# Patient Record
Sex: FEMALE | Race: WHITE | NOT HISPANIC OR LATINO | Employment: UNEMPLOYED | URBAN - NONMETROPOLITAN AREA
[De-identification: names, ages, dates, MRNs, and addresses within clinical notes are randomized per-mention and may not be internally consistent; named-entity substitution may affect disease eponyms.]

---

## 2018-09-28 ENCOUNTER — HOSPITAL ENCOUNTER (EMERGENCY)
Facility: HOSPITAL | Age: 38
Discharge: HOME | End: 2018-09-28
Attending: EMERGENCY MEDICINE | Admitting: EMERGENCY MEDICINE

## 2018-09-28 VITALS
DIASTOLIC BLOOD PRESSURE: 78 MMHG | SYSTOLIC BLOOD PRESSURE: 132 MMHG | RESPIRATION RATE: 16 BRPM | TEMPERATURE: 98.7 F | OXYGEN SATURATION: 99 % | BODY MASS INDEX: 26.68 KG/M2 | HEART RATE: 97 BPM | HEIGHT: 67 IN | WEIGHT: 170 LBS

## 2018-09-28 DIAGNOSIS — A49.02 MRSA INFECTION: ICD-10-CM

## 2018-09-28 DIAGNOSIS — L03.90 CELLULITIS, UNSPECIFIED CELLULITIS SITE: ICD-10-CM

## 2018-09-28 DIAGNOSIS — L01.00 IMPETIGO: Primary | ICD-10-CM

## 2018-09-28 PROCEDURE — 99283 EMERGENCY DEPT VISIT LOW MDM: CPT

## 2018-09-28 RX ORDER — CLINDAMYCIN HYDROCHLORIDE 150 MG/1
450 CAPSULE ORAL 3 TIMES DAILY
Qty: 63 CAPSULE | Refills: 0 | Status: SHIPPED | OUTPATIENT
Start: 2018-09-28 | End: 2018-10-05

## 2018-09-28 RX ORDER — CLINDAMYCIN HYDROCHLORIDE 150 MG/1
600 CAPSULE ORAL ONCE
Status: COMPLETED | OUTPATIENT
Start: 2018-09-28 | End: 2018-09-28

## 2018-09-28 RX ADMIN — MUPIROCIN: 20 OINTMENT TOPICAL at 20:12

## 2018-09-28 RX ADMIN — CLINDAMYCIN HYDROCHLORIDE 600 MG: 150 CAPSULE ORAL at 20:12

## 2022-03-08 ENCOUNTER — OFFICE VISIT (OUTPATIENT)
Dept: OBSTETRICS AND GYNECOLOGY | Facility: CLINIC | Age: 42
End: 2022-03-08

## 2022-03-08 VITALS
DIASTOLIC BLOOD PRESSURE: 68 MMHG | WEIGHT: 191 LBS | HEIGHT: 67 IN | SYSTOLIC BLOOD PRESSURE: 122 MMHG | BODY MASS INDEX: 29.98 KG/M2

## 2022-03-08 DIAGNOSIS — N89.8 VAGINAL DISCHARGE: ICD-10-CM

## 2022-03-08 DIAGNOSIS — Z12.4 ENCOUNTER FOR SCREENING FOR CERVICAL CANCER: Primary | ICD-10-CM

## 2022-03-08 DIAGNOSIS — N89.8 VAGINAL DISCHARGE: Primary | ICD-10-CM

## 2022-03-08 PROCEDURE — 99203 OFFICE O/P NEW LOW 30 MIN: CPT | Performed by: OBSTETRICS & GYNECOLOGY

## 2022-03-08 RX ORDER — FLUOXETINE HYDROCHLORIDE 20 MG/1
40 CAPSULE ORAL DAILY
COMMUNITY

## 2022-03-08 NOTE — PROGRESS NOTES
Subjective   Chief Complaint   Patient presents with   • Gynecologic Exam     Patient complains of BV in the past and needs a repeat pap      Olamide Alcantar is a 41 y.o. year old .  Patient's last menstrual period was 2022.  She presents to be seen because of pap and uab. At [facility] currently for substance abuse- While using didn't  Have menses. .  Recurrent BV over the past year. Thick d/c. Menses regular for the past year.    OTHER COMPLAINTS:  Nothing else    The following portions of the patient's history were reviewed and updated as appropriate:  She  has a past medical history of Abnormal Pap smear of cervix, Alcoholism (HCC), Anemia, Anxiety, Chlamydia, Ectopic pregnancy, Female infertility, Gonorrhea, Hepatitis C, HPV (human papilloma virus) infection, PID (pelvic inflammatory disease), Substance abuse (HCC), Urinary tract infection, Urogenital trichomoniasis, and Varicella.  She does not have a problem list on file.  She  has a past surgical history that includes Ectopic pregnancy (Right); Salpingectomy (Right); and Dental surgery.  Her family history includes High cholesterol in her paternal grandfather; Ovarian cancer in her mother.  She  reports that she has been smoking cigarettes. She has been smoking about 1.00 pack per day. She does not have any smokeless tobacco history on file. She reports that she does not drink alcohol and does not use drugs.  Current Outpatient Medications   Medication Sig Dispense Refill   • FLUoxetine (PROzac) 20 MG capsule Take 40 mg by mouth Daily.     • sertraline (ZOLOFT) 50 MG tablet Take 50 mg by mouth Daily.       No current facility-administered medications for this visit.     Current Outpatient Medications on File Prior to Visit   Medication Sig   • FLUoxetine (PROzac) 20 MG capsule Take 40 mg by mouth Daily.   • sertraline (ZOLOFT) 50 MG tablet Take 50 mg by mouth Daily.     No current facility-administered medications on file prior to visit.  "    She is allergic to amoxicillin and sulfa antibiotics.    Social History    Tobacco Use      Smoking status: Current Every Day Smoker        Packs/day: 1.00        Types: Cigarettes      Smokeless tobacco: Not on file    Review of Systems  Consitutional POS: nothing reported    NEG: anorexia or night sweats   Gastointestinal POS: nothing reported    NEG: bloating, change in bowel habits, melena or reflux symptoms   Genitourinary POS: nothing reported    NEG: dysuria or hematuria   Integument POS: nothing reported    NEG: moles that are changing in size, shape, color or rashes   Breast POS: nothing reported    NEG: persistent breast lump, skin dimpling or nipple discharge         Respiratory: negative  Cardiovascular: negative          Objective   /68   Ht 170.2 cm (67\")   Wt 86.6 kg (191 lb)   LMP 02/25/2022   Breastfeeding No   BMI 29.91 kg/m²     General:  well developed; well nourished  no acute distress   Skin:  No suspicious lesions seen   Thyroid: normal to inspection and palpation   Lungs:  breathing is unlabored   Heart:  Not performed.   Breasts:  Examined in supine position  Symmetric without masses or skin dimpling  Nipples normal without inversion, lesions or discharge  There are no palpable axillary nodes   Abdomen: soft, non-tender; no masses  no umbilical or inguinal hernias are present  no hepato-splenomegaly   Pelvis: Clinical staff was present for exam  External genitalia:  normal appearance of the external genitalia including Bartholin's and [place]'s glands.  :  urethral meatus normal;  Vaginal:  normal pink mucosa without prolapse or lesions.  Cervix:  normal appearance.  Uterus:  normal size, shape and consistency. fullness on left  Adnexa:  normal bimanual exam of the adnexa.  Rectal:  digital rectal exam not performed; anus visually normal appearing.     Psychiatric: Alert and oriented ×3, mood and affect appropriate  HEENT: Atraumatic, normocephalic, normal scleral " icterus  Extremities: 2+ pulses bilaterally, no edema      Lab Review   No data reviewed    Imaging   No data reviewed        Assessment   1. Inconclusive pap  2. Vaginal d/c with h/o recurrent BV per patient     Plan   1. Pap done  2. Cutlures taken  3. TVS 3-4 weeks    No orders of the defined types were placed in this encounter.         This note was electronically signed.      March 8, 2022

## 2022-03-10 LAB
A VAGINAE DNA VAG QL NAA+PROBE: NORMAL SCORE
BVAB2 DNA VAG QL NAA+PROBE: NORMAL SCORE
C ALBICANS DNA VAG QL NAA+PROBE: NEGATIVE
C GLABRATA DNA VAG QL NAA+PROBE: NEGATIVE
C TRACH DNA VAG QL NAA+PROBE: NEGATIVE
MEGA1 DNA VAG QL NAA+PROBE: NORMAL SCORE
N GONORRHOEA DNA VAG QL NAA+PROBE: NEGATIVE
T VAGINALIS DNA VAG QL NAA+PROBE: NEGATIVE

## 2022-03-18 DIAGNOSIS — Z12.4 ENCOUNTER FOR SCREENING FOR CERVICAL CANCER: ICD-10-CM

## 2022-03-21 RX ORDER — METRONIDAZOLE 500 MG/1
500 TABLET ORAL 2 TIMES DAILY
Qty: 14 TABLET | Refills: 0 | Status: SHIPPED | OUTPATIENT
Start: 2022-03-21 | End: 2022-03-28

## 2022-03-21 NOTE — PROGRESS NOTES
Flagyl twice a day has been sent in for potential bacterial vaginosis.  When patient returns to clinic we will discuss potential suppressive therapy.  Thank you

## 2022-04-07 ENCOUNTER — OFFICE VISIT (OUTPATIENT)
Dept: OBSTETRICS AND GYNECOLOGY | Facility: CLINIC | Age: 42
End: 2022-04-07

## 2022-04-07 VITALS — SYSTOLIC BLOOD PRESSURE: 110 MMHG | WEIGHT: 192 LBS | BODY MASS INDEX: 30.07 KG/M2 | DIASTOLIC BLOOD PRESSURE: 70 MMHG

## 2022-04-07 DIAGNOSIS — N89.8 VAGINAL DISCHARGE: Primary | ICD-10-CM

## 2022-04-07 PROCEDURE — 99213 OFFICE O/P EST LOW 20 MIN: CPT | Performed by: OBSTETRICS & GYNECOLOGY

## 2022-04-07 RX ORDER — METRONIDAZOLE 65 MG/5G
1 GEL TOPICAL 2 TIMES WEEKLY
Qty: 1 EACH | Refills: 4 | Status: SHIPPED | OUTPATIENT
Start: 2022-04-07 | End: 2022-04-12 | Stop reason: SDUPTHER

## 2022-04-07 NOTE — PROGRESS NOTES
Subjective   Chief Complaint   Patient presents with   • Follow-up     TVS done today     Olamide Alcantar is a 41 y.o. year old .  Patient's last menstrual period was 2022 (exact date).  She presents to be seen because of ultrasound evaluation given history of PID and tubal blockage.  As well as recurrent bacterial vaginosis.     OTHER COMPLAINTS:  Nothing else    The following portions of the patient's history were reviewed and updated as appropriate:  She  has a past medical history of Abnormal Pap smear of cervix, Alcoholism (HCC), Anemia, Anxiety, Chlamydia, Ectopic pregnancy, Female infertility, Gonorrhea, Hepatitis C, HPV (human papilloma virus) infection, PID (pelvic inflammatory disease), Substance abuse (HCC), Urinary tract infection, Urogenital trichomoniasis, and Varicella.  She does not have a problem list on file.  She  has a past surgical history that includes Ectopic pregnancy (Right); Salpingectomy (Right); and Dental surgery.  Her family history includes High cholesterol in her paternal grandfather; Ovarian cancer in her mother.  She  reports that she has been smoking cigarettes. She has been smoking about 1.00 pack per day. She does not have any smokeless tobacco history on file. She reports that she does not drink alcohol and does not use drugs.  Current Outpatient Medications   Medication Sig Dispense Refill   • FLUoxetine (PROzac) 20 MG capsule Take 40 mg by mouth Daily.     • sertraline (ZOLOFT) 50 MG tablet Take 50 mg by mouth Daily.     • metroNIDAZOLE (Nuvessa) 1.3 % gel Insert 1 application into the vagina 2 (Two) Times a Week. X 12 weeks 1 each 4     No current facility-administered medications for this visit.     Current Outpatient Medications on File Prior to Visit   Medication Sig   • FLUoxetine (PROzac) 20 MG capsule Take 40 mg by mouth Daily.   • sertraline (ZOLOFT) 50 MG tablet Take 50 mg by mouth Daily.     No current facility-administered medications on file prior to  visit.     She is allergic to amoxicillin and sulfa antibiotics.    Social History    Tobacco Use      Smoking status: Current Every Day Smoker        Packs/day: 1.00        Types: Cigarettes      Smokeless tobacco: Not on file    Review of Systems  Consitutional POS: nothing reported    NEG: anorexia or night sweats   Gastointestinal POS: nothing reported    NEG: bloating, change in bowel habits, melena or reflux symptoms   Genitourinary POS: nothing reported    NEG: dysuria or hematuria   Integument POS: nothing reported    NEG: moles that are changing in size, shape, color or rashes   Breast POS: nothing reported    NEG: persistent breast lump, skin dimpling or nipple discharge         Respiratory: negative  Cardiovascular: negative          Objective   /70   Wt 87.1 kg (192 lb)   LMP 03/23/2022 (Exact Date)   BMI 30.07 kg/m²     General:  well developed; well nourished  no acute distress   Skin:  Not performed.   Thyroid: not examined   Lungs:  breathing is unlabored   Heart:  Not performed.   Breasts:  Not performed.   Abdomen: soft, non-tender; no masses  no umbilical or inguinal hernias are present  no hepato-splenomegaly   Pelvis: Not performed.     Psychiatric: Alert and oriented ×3, mood and affect appropriate  HEENT: Atraumatic, normocephalic, normal scleral icterus  Extremities: 2+ pulses bilaterally, no edema      Lab Review   Pap IG, Rfx HPV ASCU (03/08/2022)      Imaging   Pelvic ultrasound images independantly reviewed - Uterus is retroflexed 8.8 x 4.5 x 4 cm endometrium 10.44 mm.  Normal adnexa.  2.6 cm hemorrhagic cyst on the left.  No masses.  No free fluid        Assessment   1. Recurrent bacterial vaginosis     Plan   1. MetroGel 2 times week  2. Normal TVS    No orders of the defined types were placed in this encounter.         This note was electronically signed.      April 7, 2022

## 2022-04-11 ENCOUNTER — TELEPHONE (OUTPATIENT)
Dept: OBSTETRICS AND GYNECOLOGY | Facility: CLINIC | Age: 42
End: 2022-04-11

## 2022-04-11 NOTE — TELEPHONE ENCOUNTER
----- Message from Lakeisha Mcgarry sent at 4/11/2022  3:52 PM EDT -----  PATIENT CALLED AND STATES THAT SHE WAS ONLY GIVEN 1 APPLICATOR OF NUVESSA. SHE NEEDS THIS RE PRESCRIBED WITH THE QUANTITY.      DANGELO - JOSÉ MIGUEL MCDUFFIE

## 2022-04-12 RX ORDER — METRONIDAZOLE 65 MG/5G
1 GEL TOPICAL 2 TIMES WEEKLY
Qty: 3 EACH | Refills: 4 | Status: SHIPPED | OUTPATIENT
Start: 2022-04-14 | End: 2022-09-07

## 2022-07-15 PROCEDURE — 99283 EMERGENCY DEPT VISIT LOW MDM: CPT

## 2022-07-16 ENCOUNTER — HOSPITAL ENCOUNTER (EMERGENCY)
Facility: HOSPITAL | Age: 42
Discharge: HOME | End: 2022-07-16
Attending: EMERGENCY MEDICINE | Admitting: EMERGENCY MEDICINE

## 2022-07-16 ENCOUNTER — APPOINTMENT (OUTPATIENT)
Dept: GENERAL RADIOLOGY | Facility: HOSPITAL | Age: 42
End: 2022-07-16

## 2022-07-16 VITALS
TEMPERATURE: 98.4 F | HEART RATE: 81 BPM | WEIGHT: 197 LBS | RESPIRATION RATE: 16 BRPM | HEIGHT: 68 IN | SYSTOLIC BLOOD PRESSURE: 112 MMHG | DIASTOLIC BLOOD PRESSURE: 90 MMHG | OXYGEN SATURATION: 100 % | BODY MASS INDEX: 29.86 KG/M2

## 2022-07-16 DIAGNOSIS — S93.402A SPRAIN OF LEFT ANKLE, UNSPECIFIED LIGAMENT, INITIAL ENCOUNTER: Primary | ICD-10-CM

## 2022-07-16 PROCEDURE — 73610 X-RAY EXAM OF ANKLE: CPT

## 2022-07-16 RX ORDER — IBUPROFEN 600 MG/1
600 TABLET ORAL EVERY 6 HOURS PRN
Qty: 30 TABLET | Refills: 0 | Status: SHIPPED | OUTPATIENT
Start: 2022-07-16

## 2022-07-16 NOTE — ED PROVIDER NOTES
Subjective   41-year-old female presents to the ED with a chief complaint of ankle pain.  Patient is complaining of severe left ankle pain that started a few hours prior to arrival.  Patient states that she was running to check on person that was having a seizure.  She states that while she was running she injured her left ankle.  She is unsure what she did but she is having severe pain and the left ankle.  She states that when she tries to ambulate she feels like the left ankle pops and cracks.  Pain is worse in the lateral malleolus.  She admits to worsening of pain with weightbearing.  Denies other injuries or complaints.  No prior treatments relieving factors.  No prior evaluations.  No other complaints at this time.          Review of Systems   Musculoskeletal: Positive for arthralgias and joint swelling.        Left ankle pain and injury   All other systems reviewed and are negative.      Past Medical History:   Diagnosis Date   • Abnormal Pap smear of cervix    • Alcoholism (HCC)    • Anemia    • Anxiety    • Chlamydia    • Ectopic pregnancy    • Female infertility    • Gonorrhea    • Hepatitis C    • HPV (human papilloma virus) infection    • PID (pelvic inflammatory disease)    • Substance abuse (HCC)    • Urinary tract infection    • Urogenital trichomoniasis    • Varicella        Allergies   Allergen Reactions   • Amoxicillin Hives   • Sulfa Antibiotics Hives       Past Surgical History:   Procedure Laterality Date   • DENTAL PROCEDURE     • ECTOPIC PREGNANCY Right    • SALPINGECTOMY Right        Family History   Problem Relation Age of Onset   • Ovarian cancer Mother    • High cholesterol Paternal Grandfather        Social History     Socioeconomic History   • Marital status: Single   Tobacco Use   • Smoking status: Current Every Day Smoker     Packs/day: 1.00     Types: Cigarettes   Vaping Use   • Vaping Use: Never used   Substance and Sexual Activity   • Alcohol use: No   • Drug use: No   • Sexual  activity: Not Currently     Partners: Male     Birth control/protection: Abstinence           Objective   Physical Exam  Vitals and nursing note reviewed.   Constitutional:       General: She is not in acute distress.     Appearance: She is well-developed. She is not diaphoretic.   HENT:      Head: Normocephalic and atraumatic.      Nose: Nose normal.   Eyes:      Conjunctiva/sclera: Conjunctivae normal.   Cardiovascular:      Rate and Rhythm: Normal rate and regular rhythm.   Pulmonary:      Effort: Pulmonary effort is normal. No respiratory distress.      Breath sounds: Normal breath sounds.   Abdominal:      General: There is no distension.      Palpations: Abdomen is soft.      Tenderness: There is no abdominal tenderness. There is no guarding.   Musculoskeletal:         General: Signs of injury present. No deformity.      Comments: Left ankle pain and swelling, TTP to left lateral malleolous with moderate amount of edema   Neurological:      Mental Status: She is alert and oriented to person, place, and time.      Cranial Nerves: No cranial nerve deficit.         Procedures           ED Course                                           MDM  41-year-old female with left ankle pain.  X-rays negative for acute process.  Exam consistent with significant sprain.  Placed in a boot.  Appropriate for discharge symptomatic treatment.  Patient read with this plan.          Final diagnoses:   Sprain of left ankle, unspecified ligament, initial encounter       ED Disposition  ED Disposition     ED Disposition   Discharge    Condition   Stable    Comment   --             Navid Huddleston MD  [address] [place] [address] [id number] [phone]    Schedule an appointment as soon as possible for a visit            Medication List      New Prescriptions    ibuprofen 600 MG tablet  Commonly known as: ADVIL,MOTRIN  Take 1 tablet by mouth Every 6 (Six) Hours As Needed for Mild Pain .           Where to Get Your Medications       These medications were sent to [facility]/pharmacy #[id number] - [place], [place] - [address] [facility] - [phone]  - [phone]   [address] [address]    Phone: [phone]   · ibuprofen 600 MG tablet          Jon Hemphill,   07/16/22 0483

## 2022-09-02 ENCOUNTER — TELEPHONE (OUTPATIENT)
Dept: INTERNAL MEDICINE | Facility: CLINIC | Age: 42
End: 2022-09-02

## 2022-09-02 NOTE — TELEPHONE ENCOUNTER
Caller: Olamide Rey    Relationship to patient: Self    Best call back number: [phone]    Date of positive COVID19 test: 9/2  Date if possible COVID19 exposure: Friday OF LAST WEEK    COVID19 symptoms:   CONGESTED THROAT SCRATCHY,HEADACHE, BODYACHES    Additional information or concerns: PT STATED THAT SHE HAS NEW PT APPT WITH DR HATCH ON 9/7 AND NOT SURE AS TO WHAT TO DO    What is the patients preferred pharmacy:   [facility]/pharmacy #[id number] [address] [phone] [place] [phone]

## 2022-09-07 ENCOUNTER — OFFICE VISIT (OUTPATIENT)
Dept: INTERNAL MEDICINE | Facility: CLINIC | Age: 42
End: 2022-09-07

## 2022-09-07 VITALS
SYSTOLIC BLOOD PRESSURE: 120 MMHG | OXYGEN SATURATION: 96 % | HEART RATE: 73 BPM | HEIGHT: 68 IN | WEIGHT: 195 LBS | TEMPERATURE: 96.8 F | DIASTOLIC BLOOD PRESSURE: 80 MMHG | BODY MASS INDEX: 29.55 KG/M2

## 2022-09-07 DIAGNOSIS — F41.9 ANXIETY: ICD-10-CM

## 2022-09-07 DIAGNOSIS — Z72.0 TOBACCO USE: ICD-10-CM

## 2022-09-07 DIAGNOSIS — B19.20 HEPATITIS C VIRUS INFECTION WITHOUT HEPATIC COMA, UNSPECIFIED CHRONICITY: Primary | ICD-10-CM

## 2022-09-07 DIAGNOSIS — E55.9 VITAMIN D DEFICIENCY: ICD-10-CM

## 2022-09-07 DIAGNOSIS — Z00.00 ANNUAL PHYSICAL EXAM: ICD-10-CM

## 2022-09-07 DIAGNOSIS — F15.11 HISTORY OF METHAMPHETAMINE ABUSE: ICD-10-CM

## 2022-09-07 DIAGNOSIS — F10.11 HISTORY OF ETOH ABUSE: ICD-10-CM

## 2022-09-07 DIAGNOSIS — J06.9 ACUTE URI: ICD-10-CM

## 2022-09-07 PROCEDURE — 99385 PREV VISIT NEW AGE 18-39: CPT | Performed by: INTERNAL MEDICINE

## 2022-09-07 PROCEDURE — 2014F MENTAL STATUS ASSESS: CPT | Performed by: INTERNAL MEDICINE

## 2022-09-07 PROCEDURE — 99213 OFFICE O/P EST LOW 20 MIN: CPT | Performed by: INTERNAL MEDICINE

## 2022-09-07 PROCEDURE — 3008F BODY MASS INDEX DOCD: CPT | Performed by: INTERNAL MEDICINE

## 2022-09-07 NOTE — PROGRESS NOTES
Subjective   Olamide Rey is a 41 y.o. female and is here for a comprehensive physical exam.     Do you take any herbs or supplements that were not prescribed by a doctor? no  Are you taking calcium supplements? no  Are you taking aspirin daily? no      The following portions of the patient's history were reviewed and updated as appropriate: allergies, current medications, past family history, past medical history, past social history, past surgical history and problem list.      Review of Systems   Constitutional: Negative.    HENT: Positive for congestion.    Eyes: Negative.    Respiratory: Positive for cough.    Cardiovascular: Negative.    Gastrointestinal: Negative.    Endocrine: Negative.    Genitourinary: Negative.    Musculoskeletal: Negative.    Skin: Negative.    Allergic/Immunologic: Negative.    Neurological: Negative.    Hematological: Negative.    Psychiatric/Behavioral: Negative.          Physical Exam  Constitutional:       Appearance: Normal appearance. She is well-developed and normal weight.   HENT:      Head: Normocephalic and atraumatic.      Right Ear: Tympanic membrane, ear canal and external ear normal.      Left Ear: Tympanic membrane and external ear normal.      Nose: Nose normal.      Mouth/Throat:      Mouth: Mucous membranes are moist.      Pharynx: Oropharynx is clear.   Eyes:      Conjunctiva/sclera: Conjunctivae normal.      Pupils: Pupils are equal, round, and reactive to light.   Cardiovascular:      Rate and Rhythm: Normal rate and regular rhythm.      Heart sounds: Normal heart sounds.   Pulmonary:      Effort: Pulmonary effort is normal.      Breath sounds: Normal breath sounds.   Abdominal:      General: Bowel sounds are normal.      Palpations: Abdomen is soft.   Genitourinary:     Vagina: Normal.   Musculoskeletal:         General: Normal range of motion.      Cervical back: Normal range of motion and neck supple.   Skin:     General: Skin is warm and dry.   Neurological:       Mental Status: She is alert and oriented to person, place, and time.      Deep Tendon Reflexes: Reflexes are normal and symmetric.   Psychiatric:         Mood and Affect: Mood normal.         Behavior: Behavior normal.         Thought Content: Thought content normal.         Judgment: Judgment normal.         All  tests have been reviewed.    Assessment & Plan          1. Patient Counseling:  --Nutrition: Stressed importance of moderation in sodium/caffeine intake, saturated fat and cholesterol, caloric balance, sufficient intake of fresh fruits, vegetables, fiber, calcium and iron.  --Exercise: Stressed the importance of regular exercise.   --Injury prevention: Discussed safety belts, safety helmets, smoke detector, smoking near bedding or upholstery.   --Dental health: Discussed importance of regular tooth brushing, flossing, and dental visits.  --Immunizations reviewed.  --Discussed benefits of screening colonoscopy.  --After hours service discussed with patient    2. Discussed the patient's BMI with her.    Body mass index is 29.65 kg/m².  BMI is >= 25 and <30. (Overweight) The following options were offered after discussion;: exercise counseling/recommendations and nutrition counseling/recommendations

## 2022-09-07 NOTE — PROGRESS NOTES
Subjective   Olamide Rey is a 41 y.o. female.     Chief Complaint   Patient presents with   • Establish Care   • Annual Exam   • ADD   • URI     COVID+ on 09/02/20202; symptoms resolved except for cough        History of Present Illness   patient here to establish care also for physical. Five days ago patient was tested positive for COVID infection. Patient states symptoms much improved acceptor some coughing and sinus congestion. Patient still has mild diarrhea. Anxiety stable medication. Patient does have a history of methamphetamine and alcohol use. Patient is still vape and smoke cigarette. History of hepatitis C.    Current Outpatient Medications:   •  FLUoxetine (PROzac) 20 MG capsule, Take 40 mg by mouth Daily., Disp: , Rfl:   •  ibuprofen (ADVIL,MOTRIN) 600 MG tablet, Take 1 tablet by mouth Every 6 (Six) Hours As Needed for Mild Pain ., Disp: 30 tablet, Rfl: 0    The following portions of the patient's history were reviewed and updated as appropriate: allergies, current medications, past family history, past medical history, past social history, past surgical history and problem list.    Review of Systems   Constitutional: Negative.    Respiratory: Negative.    Cardiovascular: Negative.    Gastrointestinal: Negative.    Musculoskeletal: Negative.    Skin: Negative.    Neurological: Negative.    Psychiatric/Behavioral: Negative.        Objective   Physical Exam  Cardiovascular:      Rate and Rhythm: Normal rate and regular rhythm.      Heart sounds: Normal heart sounds.   Pulmonary:      Effort: Pulmonary effort is normal.      Breath sounds: Normal breath sounds.   Abdominal:      General: Bowel sounds are normal.   Musculoskeletal:      Cervical back: Neck supple.   Skin:     General: Skin is warm.   Neurological:      Mental Status: She is alert and oriented to person, place, and time.         All tests have been reviewed.    Assessment & Plan   Diagnoses and all orders for this visit:    Hepatitis C  virus infection without hepatic coma, unspecified chronicity check lab  -     Hepatitis C RNA, Quantitative, PCR (graph)    History of methamphetamine abuse (HCC)    History of ETOH abuse  -     Ethanol level    Tobacco use to quit    Anxiety stable on Lexapro continue medication    Vitamin D deficiency history of vitamin D deficiency check lab  -     Vitamin D 25 Hydroxy    Annual physical exam  -     CBC & Differential  -     Comprehensive Metabolic Panel  -     Lipid Panel  -     TSH    Acute URI COVID positive. As patient to you Zyrtec Mucinex and Tylenol      Three week follow-up

## 2022-10-04 LAB
25(OH)D3+25(OH)D2 SERPL-MCNC: 34.4 NG/ML (ref 30–100)
ALBUMIN SERPL-MCNC: 4.6 G/DL (ref 3.8–4.8)
ALBUMIN/GLOB SERPL: 1.8 {RATIO} (ref 1.2–2.2)
ALP SERPL-CCNC: 72 IU/L (ref 44–121)
ALT SERPL-CCNC: 7 IU/L (ref 0–32)
AST SERPL-CCNC: 11 IU/L (ref 0–40)
BASOPHILS # BLD AUTO: 0.1 X10E3/UL (ref 0–0.2)
BASOPHILS NFR BLD AUTO: 1 %
BILIRUB SERPL-MCNC: 0.4 MG/DL (ref 0–1.2)
BUN SERPL-MCNC: 9 MG/DL (ref 6–20)
BUN/CREAT SERPL: 11 (ref 9–23)
CALCIUM SERPL-MCNC: 9.6 MG/DL (ref 8.7–10.2)
CHLORIDE SERPL-SCNC: 101 MMOL/L (ref 96–106)
CHOLEST SERPL-MCNC: 215 MG/DL (ref 100–199)
CO2 SERPL-SCNC: 23 MMOL/L (ref 20–29)
CREAT SERPL-MCNC: 0.85 MG/DL (ref 0.57–1)
EGFRCR SERPLBLD CKD-EPI 2021: 89 ML/MIN/1.73
EOSINOPHIL # BLD AUTO: 0.2 X10E3/UL (ref 0–0.4)
EOSINOPHIL NFR BLD AUTO: 3 %
ERYTHROCYTE [DISTWIDTH] IN BLOOD BY AUTOMATED COUNT: 12.8 % (ref 11.7–15.4)
ETHANOL BLD GC-MCNC: <.01 %
GLOBULIN SER CALC-MCNC: 2.6 G/DL (ref 1.5–4.5)
GLUCOSE SERPL-MCNC: 75 MG/DL (ref 70–99)
HCT VFR BLD AUTO: 38.9 % (ref 34–46.6)
HCV RNA SERPL NAA+PROBE-ACNC: NORMAL IU/ML
HDLC SERPL-MCNC: 55 MG/DL
HGB BLD-MCNC: 12.4 G/DL (ref 11.1–15.9)
IMM GRANULOCYTES # BLD AUTO: 0 X10E3/UL (ref 0–0.1)
IMM GRANULOCYTES NFR BLD AUTO: 1 %
LDLC SERPL CALC-MCNC: 146 MG/DL (ref 0–99)
LYMPHOCYTES # BLD AUTO: 2.6 X10E3/UL (ref 0.7–3.1)
LYMPHOCYTES NFR BLD AUTO: 40 %
MCH RBC QN AUTO: 30.1 PG (ref 26.6–33)
MCHC RBC AUTO-ENTMCNC: 31.9 G/DL (ref 31.5–35.7)
MCV RBC AUTO: 94 FL (ref 79–97)
MONOCYTES # BLD AUTO: 0.6 X10E3/UL (ref 0.1–0.9)
MONOCYTES NFR BLD AUTO: 9 %
NEUTROPHILS # BLD AUTO: 3.1 X10E3/UL (ref 1.4–7)
NEUTROPHILS NFR BLD AUTO: 46 %
PLATELET # BLD AUTO: 303 X10E3/UL (ref 150–450)
POTASSIUM SERPL-SCNC: 3.7 MMOL/L (ref 3.5–5.2)
PROT SERPL-MCNC: 7.2 G/DL (ref 6–8.5)
RBC # BLD AUTO: 4.12 X10E6/UL (ref 3.77–5.28)
SODIUM SERPL-SCNC: 139 MMOL/L (ref 134–144)
TEST INFORMATION: NORMAL
TRIGL SERPL-MCNC: 81 MG/DL (ref 0–149)
TSH SERPL DL<=0.005 MIU/L-ACNC: 1.42 UIU/ML (ref 0.45–4.5)
VLDLC SERPL CALC-MCNC: 14 MG/DL (ref 5–40)
WBC # BLD AUTO: 6.6 X10E3/UL (ref 3.4–10.8)

## 2022-10-07 ENCOUNTER — OFFICE VISIT (OUTPATIENT)
Dept: INTERNAL MEDICINE | Facility: CLINIC | Age: 42
End: 2022-10-07

## 2022-10-07 VITALS
TEMPERATURE: 97.6 F | DIASTOLIC BLOOD PRESSURE: 70 MMHG | BODY MASS INDEX: 29.43 KG/M2 | WEIGHT: 194.2 LBS | HEIGHT: 68 IN | SYSTOLIC BLOOD PRESSURE: 112 MMHG | HEART RATE: 66 BPM | OXYGEN SATURATION: 98 %

## 2022-10-07 DIAGNOSIS — E78.5 HYPERLIPIDEMIA, UNSPECIFIED HYPERLIPIDEMIA TYPE: Primary | ICD-10-CM

## 2022-10-07 DIAGNOSIS — Z23 INFLUENZA VACCINE NEEDED: ICD-10-CM

## 2022-10-07 PROCEDURE — 99213 OFFICE O/P EST LOW 20 MIN: CPT | Performed by: NURSE PRACTITIONER

## 2022-10-07 PROCEDURE — 90686 IIV4 VACC NO PRSV 0.5 ML IM: CPT | Performed by: NURSE PRACTITIONER

## 2022-10-07 PROCEDURE — 90471 IMMUNIZATION ADMIN: CPT | Performed by: NURSE PRACTITIONER

## 2022-10-09 NOTE — PROGRESS NOTES
"Chief Complaint   Patient presents with   • Follow-up     Review lab results     Subjective   Olamide Rey is a 41 y.o. female.     History of Present Illness     Patient presents for follow-up  to discuss lab results.  No acute complaints or concerns.  Not needing refills of any of her medications.  She is due a flu vaccination, will complete this today.  Declines COVID-vaccine.    The following portions of the patient's history were reviewed and updated as appropriate: allergies, current medications, past family history, past medical history, past social history, past surgical history and problem list.    Review of Systems   All other systems reviewed and are negative.      Objective   /70   Pulse 66   Temp 97.6 °F (36.4 °C) (Temporal)   Ht 172.7 cm (68\")   Wt 88.1 kg (194 lb 3.2 oz)   LMP 09/18/2022 (Approximate)   SpO2 98%   BMI 29.53 kg/m²   Body mass index is 29.53 kg/m².  Physical Exam  Vitals and nursing note reviewed.   Constitutional:       Appearance: Normal appearance.      Interventions: Face mask in place.   HENT:      Head: Normocephalic and atraumatic.      Right Ear: External ear normal.      Left Ear: External ear normal.      Nose: Nose normal.   Eyes:      General:         Right eye: No discharge.         Left eye: No discharge.      Extraocular Movements: Extraocular movements intact.   Cardiovascular:      Rate and Rhythm: Normal rate and regular rhythm.   Pulmonary:      Effort: Pulmonary effort is normal.      Breath sounds: Normal breath sounds.   Musculoskeletal:         General: Normal range of motion.      Cervical back: Normal range of motion.   Skin:     General: Skin is dry.   Neurological:      General: No focal deficit present.      Mental Status: She is alert and oriented to person, place, and time.   Psychiatric:         Mood and Affect: Mood normal.         Behavior: Behavior normal.         Labs:  Results for orders placed or performed in visit on 09/07/22 "   Comprehensive Metabolic Panel    Specimen: Blood   Result Value Ref Range    Glucose 75 70 - 99 mg/dL    BUN 9 6 - 20 mg/dL    Creatinine 0.85 0.57 - 1.00 mg/dL    EGFR Result 89 >59 mL/min/1.73    BUN/Creatinine Ratio 11 9 - 23    Sodium 139 134 - 144 mmol/L    Potassium 3.7 3.5 - 5.2 mmol/L    Chloride 101 96 - 106 mmol/L    Total CO2 23 20 - 29 mmol/L    Calcium 9.6 8.7 - 10.2 mg/dL    Total Protein 7.2 6.0 - 8.5 g/dL    Albumin 4.6 3.8 - 4.8 g/dL    Globulin 2.6 1.5 - 4.5 g/dL    A/G Ratio 1.8 1.2 - 2.2    Total Bilirubin 0.4 0.0 - 1.2 mg/dL    Alkaline Phosphatase 72 44 - 121 IU/L    AST (SGOT) 11 0 - 40 IU/L    ALT (SGPT) 7 0 - 32 IU/L   Lipid Panel    Specimen: Blood   Result Value Ref Range    Total Cholesterol 215 (H) 100 - 199 mg/dL    Triglycerides 81 0 - 149 mg/dL    HDL Cholesterol 55 >39 mg/dL    VLDL Cholesterol Daniel 14 5 - 40 mg/dL    LDL Chol Calc (NIH) 146 (H) 0 - 99 mg/dL   TSH    Specimen: Blood   Result Value Ref Range    TSH 1.420 0.450 - 4.500 uIU/mL   Vitamin D 25 Hydroxy    Specimen: Blood   Result Value Ref Range    25 Hydroxy, Vitamin D 34.4 30.0 - 100.0 ng/mL   Hepatitis C RNA, Quantitative, PCR (graph)   Result Value Ref Range    Hepatitis C Quantitation HCV Not Detected IU/mL    Test Information Comment    Ethanol   Result Value Ref Range    Ethanol % <.010 Cutoff=0.010 %   CBC & Differential    Specimen: Blood   Result Value Ref Range    WBC 6.6 3.4 - 10.8 x10E3/uL    RBC 4.12 3.77 - 5.28 x10E6/uL    Hemoglobin 12.4 11.1 - 15.9 g/dL    Hematocrit 38.9 34.0 - 46.6 %    MCV 94 79 - 97 fL    MCH 30.1 26.6 - 33.0 pg    MCHC 31.9 31.5 - 35.7 g/dL    RDW 12.8 11.7 - 15.4 %    Platelets 303 150 - 450 x10E3/uL    Neutrophil Rel % 46 Not Estab. %    Lymphocyte Rel % 40 Not Estab. %    Monocyte Rel % 9 Not Estab. %    Eosinophil Rel % 3 Not Estab. %    Basophil Rel % 1 Not Estab. %    Neutrophils Absolute 3.1 1.4 - 7.0 x10E3/uL    Lymphocytes Absolute 2.6 0.7 - 3.1 x10E3/uL    Monocytes  Absolute 0.6 0.1 - 0.9 x10E3/uL    Eosinophils Absolute 0.2 0.0 - 0.4 x10E3/uL    Basophils Absolute 0.1 0.0 - 0.2 x10E3/uL    Immature Granulocyte Rel % 1 Not Estab. %    Immature Grans Absolute 0.0 0.0 - 0.1 x10E3/uL       Assessment & Plan   Olamide Rey is here today and the following problems have been addressed:      Diagnoses and all orders for this visit:    1. Hyperlipidemia (Primary)  Lipid Panel    Lipid Panel 9/30/22   Total Cholesterol 215 (A)   Triglycerides 81   HDL Cholesterol 55   VLDL Cholesterol 14   LDL Cholesterol  146 (A)     Total cholesterol and LDL cholesterol are considered elevated.  Recommend Mediterranean diet and 150 minutes of exercise per week.  Went into details regarding Mediterranean diet and attached information to AVS for review.  All other labs are within normal limits.  Her vitamin D is considered normal, but low end of normal.  Recommend a vitamin D over-the-counter supplement 400 to 600 IUs daily.    2. Influenza vaccine needed  -     FluLaval/Fluzone >6 mos ([id number])  - Influenza vaccine administered, tolerated well.  VIS given.        Follow Up   Return if symptoms worsen or fail to improve.  Patient was given instructions and counseling regarding her condition or for health maintenance advice. Please see specific information pulled into the AVS if appropriate.     Shani HERNANDEZ  [facility] - [place]

## 2023-01-26 ENCOUNTER — TRANSCRIBE ORDERS (OUTPATIENT)
Dept: ADMINISTRATIVE | Facility: HOSPITAL | Age: 43
End: 2023-01-26
Payer: COMMERCIAL

## 2023-01-26 DIAGNOSIS — Z12.31 SCREENING MAMMOGRAM FOR BREAST CANCER: Primary | ICD-10-CM

## 2023-02-04 ENCOUNTER — APPOINTMENT (OUTPATIENT)
Dept: GENERAL RADIOLOGY | Facility: HOSPITAL | Age: 43
End: 2023-02-04
Payer: COMMERCIAL

## 2023-02-04 ENCOUNTER — HOSPITAL ENCOUNTER (EMERGENCY)
Facility: HOSPITAL | Age: 43
Discharge: HOME | End: 2023-02-04
Attending: EMERGENCY MEDICINE | Admitting: EMERGENCY MEDICINE
Payer: COMMERCIAL

## 2023-02-04 VITALS
DIASTOLIC BLOOD PRESSURE: 92 MMHG | OXYGEN SATURATION: 98 % | HEIGHT: 67 IN | SYSTOLIC BLOOD PRESSURE: 115 MMHG | WEIGHT: 195 LBS | TEMPERATURE: 98 F | HEART RATE: 67 BPM | BODY MASS INDEX: 30.61 KG/M2 | RESPIRATION RATE: 20 BRPM

## 2023-02-04 DIAGNOSIS — R07.9 CHEST PAIN, UNSPECIFIED TYPE: Primary | ICD-10-CM

## 2023-02-04 LAB
ALBUMIN SERPL-MCNC: 4.2 G/DL (ref 3.5–5.2)
ALBUMIN/GLOB SERPL: 1.7 G/DL
ALP SERPL-CCNC: 70 U/L (ref 39–117)
ALT SERPL W P-5'-P-CCNC: 7 U/L (ref 1–33)
ANION GAP SERPL CALCULATED.3IONS-SCNC: 10.1 MMOL/L (ref 5–15)
AST SERPL-CCNC: 13 U/L (ref 1–32)
BASOPHILS # BLD AUTO: 0.05 10*3/MM3 (ref 0–0.2)
BASOPHILS NFR BLD AUTO: 0.6 % (ref 0–1.5)
BILIRUB SERPL-MCNC: 0.3 MG/DL (ref 0–1.2)
BUN SERPL-MCNC: 10 MG/DL (ref 6–20)
BUN/CREAT SERPL: 16.1 (ref 7–25)
CALCIUM SPEC-SCNC: 9.1 MG/DL (ref 8.6–10.5)
CHLORIDE SERPL-SCNC: 102 MMOL/L (ref 98–107)
CO2 SERPL-SCNC: 24.9 MMOL/L (ref 22–29)
CREAT SERPL-MCNC: 0.62 MG/DL (ref 0.57–1)
D DIMER PPP FEU-MCNC: <0.27 MCGFEU/ML (ref 0–0.5)
DEPRECATED RDW RBC AUTO: 43.2 FL (ref 37–54)
EGFRCR SERPLBLD CKD-EPI 2021: 115.6 ML/MIN/1.73
EOSINOPHIL # BLD AUTO: 0.15 10*3/MM3 (ref 0–0.4)
EOSINOPHIL NFR BLD AUTO: 1.7 % (ref 0.3–6.2)
ERYTHROCYTE [DISTWIDTH] IN BLOOD BY AUTOMATED COUNT: 12.9 % (ref 12.3–15.4)
GLOBULIN UR ELPH-MCNC: 2.5 GM/DL
GLUCOSE SERPL-MCNC: 80 MG/DL (ref 65–99)
HCT VFR BLD AUTO: 37.7 % (ref 34–46.6)
HGB BLD-MCNC: 12.7 G/DL (ref 12–15.9)
HOLD SPECIMEN: NORMAL
HOLD SPECIMEN: NORMAL
IMM GRANULOCYTES # BLD AUTO: 0.03 10*3/MM3 (ref 0–0.05)
IMM GRANULOCYTES NFR BLD AUTO: 0.3 % (ref 0–0.5)
LYMPHOCYTES # BLD AUTO: 1.93 10*3/MM3 (ref 0.7–3.1)
LYMPHOCYTES NFR BLD AUTO: 22.1 % (ref 19.6–45.3)
MCH RBC QN AUTO: 30.9 PG (ref 26.6–33)
MCHC RBC AUTO-ENTMCNC: 33.7 G/DL (ref 31.5–35.7)
MCV RBC AUTO: 91.7 FL (ref 79–97)
MONOCYTES # BLD AUTO: 0.68 10*3/MM3 (ref 0.1–0.9)
MONOCYTES NFR BLD AUTO: 7.8 % (ref 5–12)
NEUTROPHILS NFR BLD AUTO: 5.88 10*3/MM3 (ref 1.7–7)
NEUTROPHILS NFR BLD AUTO: 67.5 % (ref 42.7–76)
NRBC BLD AUTO-RTO: 0 /100 WBC (ref 0–0.2)
PLATELET # BLD AUTO: 285 10*3/MM3 (ref 140–450)
PMV BLD AUTO: 10 FL (ref 6–12)
POTASSIUM SERPL-SCNC: 3.4 MMOL/L (ref 3.5–5.2)
PROT SERPL-MCNC: 6.7 G/DL (ref 6–8.5)
RBC # BLD AUTO: 4.11 10*6/MM3 (ref 3.77–5.28)
SODIUM SERPL-SCNC: 137 MMOL/L (ref 136–145)
TROPONIN T SERPL-MCNC: <0.01 NG/ML (ref 0–0.03)
WBC NRBC COR # BLD: 8.72 10*3/MM3 (ref 3.4–10.8)
WHOLE BLOOD HOLD COAG: NORMAL
WHOLE BLOOD HOLD SPECIMEN: NORMAL

## 2023-02-04 PROCEDURE — 96374 THER/PROPH/DIAG INJ IV PUSH: CPT

## 2023-02-04 PROCEDURE — 71045 X-RAY EXAM CHEST 1 VIEW: CPT

## 2023-02-04 PROCEDURE — 99283 EMERGENCY DEPT VISIT LOW MDM: CPT

## 2023-02-04 PROCEDURE — 80053 COMPREHEN METABOLIC PANEL: CPT | Performed by: PHYSICIAN ASSISTANT

## 2023-02-04 PROCEDURE — 25010000002 KETOROLAC TROMETHAMINE PER 15 MG: Performed by: PHYSICIAN ASSISTANT

## 2023-02-04 PROCEDURE — 84484 ASSAY OF TROPONIN QUANT: CPT | Performed by: PHYSICIAN ASSISTANT

## 2023-02-04 PROCEDURE — 85025 COMPLETE CBC W/AUTO DIFF WBC: CPT | Performed by: PHYSICIAN ASSISTANT

## 2023-02-04 PROCEDURE — 93005 ELECTROCARDIOGRAM TRACING: CPT

## 2023-02-04 PROCEDURE — 85379 FIBRIN DEGRADATION QUANT: CPT | Performed by: PHYSICIAN ASSISTANT

## 2023-02-04 RX ORDER — SODIUM CHLORIDE 0.9 % (FLUSH) 0.9 %
10 SYRINGE (ML) INJECTION AS NEEDED
Status: DISCONTINUED | OUTPATIENT
Start: 2023-02-04 | End: 2023-02-04 | Stop reason: HOSPADM

## 2023-02-04 RX ORDER — KETOROLAC TROMETHAMINE 30 MG/ML
30 INJECTION, SOLUTION INTRAMUSCULAR; INTRAVENOUS ONCE
Status: COMPLETED | OUTPATIENT
Start: 2023-02-04 | End: 2023-02-04

## 2023-02-04 RX ADMIN — KETOROLAC TROMETHAMINE 30 MG: 30 INJECTION, SOLUTION INTRAMUSCULAR; INTRAVENOUS at 16:50

## 2023-02-04 NOTE — ED PROVIDER NOTES
Subjective  History of Present Illness:    Chief Complaint:   Chief Complaint   Patient presents with   • Chest Pain      History of Present Illness: Olamide Rey is a 42 y.o. female who presents to the emergency department complaining of left-sided chest pain, pain with a deep breath.  4 days ago patient states she noted pain around the site above her left breast area.  No rash.  No known injury but does work at [employer] and does a lot of heavy lifting.  Pain is worse with a deep breath and movement.  Took ibuprofen prior to arrival and states no chest tenderness at this time.  No cough or URI symptoms.  Does have history of IVDU but has been clean for 20 months.  No radiation of the pain  Onset: 4 days ago  Duration: Remittent  Exacerbating / Alleviating factors: Worse with deep breath and movement  Associated symptoms: None      Nurses Notes reviewed and agree, including vitals, allergies, social history and prior medical history.     Review of Systems   Constitutional: Negative.    HENT: Negative.    Eyes: Negative.    Respiratory: Negative.    Cardiovascular: Positive for chest pain.   Gastrointestinal: Negative.    Genitourinary: Negative.    Musculoskeletal: Negative.    Skin: Negative.    Neurological: Negative.    Psychiatric/Behavioral: Negative.        Past Medical History:   Diagnosis Date   • Abnormal Pap smear of cervix    • Alcoholism (HCC)    • Anemia    • Anxiety    • Chlamydia    • Ectopic pregnancy    • Female infertility    • Gonorrhea    • Hepatitis C    • HPV (human papilloma virus) infection    • PID (pelvic inflammatory disease)    • Substance abuse (HCC)    • Urinary tract infection    • Urogenital trichomoniasis    • Varicella        Allergies:    Amoxicillin and Sulfa antibiotics      Past Surgical History:   Procedure Laterality Date   • DENTAL PROCEDURE     • ECTOPIC PREGNANCY Right    • SALPINGECTOMY Right          Social History     Socioeconomic History   • Marital status: Single  "  Tobacco Use   • Smoking status: Every Day     Packs/day: 1.00     Types: Cigarettes   • Smokeless tobacco: Never   Vaping Use   • Vaping Use: Never used   Substance and Sexual Activity   • Alcohol use: No   • Drug use: Yes     Types: Methamphetamines     Comment: sober since 05/2021   • Sexual activity: Not Currently     Partners: Male     Birth control/protection: Abstinence         Family History   Problem Relation Age of Onset   • Ovarian cancer Mother    • High cholesterol Paternal Grandfather        Objective  Physical Exam:  /92 (BP Location: Left arm, Patient Position: Sitting)   Pulse 67   Temp 98 °F (36.7 °C) (Oral)   Resp 20   Ht 170.2 cm (67\")   Wt 88.5 kg (195 lb)   LMP 01/21/2023   SpO2 98%   BMI 30.54 kg/m²      Physical Exam  Vitals and nursing note reviewed.   Constitutional:       Appearance: She is well-developed.   HENT:      Head: Normocephalic and atraumatic.   Cardiovascular:      Rate and Rhythm: Normal rate and regular rhythm.      Heart sounds: Normal heart sounds.   Pulmonary:      Effort: Pulmonary effort is normal.      Breath sounds: Normal breath sounds. No decreased breath sounds, wheezing, rhonchi or rales.   Abdominal:      Palpations: Abdomen is soft.   Musculoskeletal:         General: Normal range of motion.      Cervical back: Normal range of motion.   Skin:     General: Skin is warm and dry.   Neurological:      General: No focal deficit present.      Mental Status: She is alert.   Psychiatric:         Mood and Affect: Mood normal.         Behavior: Behavior normal.           Procedures    ED Course:    ED Course as of 02/04/23 1755   Sat Feb 04, 2023   1655 CBC & Differential [TM]   1711 Troponin T: <0.010 [TM]   1732 EKG interpreted by me.  Sinus rhythm.  Rate of 62.  No ST segment or T wave changes.  Normal EKG [CG]   1748 D-Dimer, Quant: <0.27 [TM]      ED Course User Index  [CG] Jon Hemphill,   [TM] Isaias Kim PA-C       Lab Results " (last 24 hours)     Procedure Component Value Units Date/Time    CBC & Differential [[id number]]  (Normal) Collected: 02/04/23 1640    Specimen: Blood Updated: 02/04/23 1651    Narrative:      The following orders were created for panel order CBC & Differential.  Procedure                               Abnormality         Status                     ---------                               -----------         ------                     CBC Auto Differential[[id number]]        Normal              Final result                 Please view results for these tests on the individual orders.    Comprehensive Metabolic Panel [[id number]]  (Abnormal) Collected: 02/04/23 1640    Specimen: Blood Updated: 02/04/23 1712     Glucose 80 mg/dL      BUN 10 mg/dL      Creatinine 0.62 mg/dL      Sodium 137 mmol/L      Potassium 3.4 mmol/L      Chloride 102 mmol/L      CO2 24.9 mmol/L      Calcium 9.1 mg/dL      Total Protein 6.7 g/dL      Albumin 4.2 g/dL      ALT (SGPT) 7 U/L      AST (SGOT) 13 U/L      Alkaline Phosphatase 70 U/L      Total Bilirubin 0.3 mg/dL      Globulin 2.5 gm/dL      A/G Ratio 1.7 g/dL      BUN/Creatinine Ratio 16.1     Anion Gap 10.1 mmol/L      eGFR 115.6 mL/min/1.73     Narrative:      GFR Normal >60  Chronic Kidney Disease <60  Kidney Failure <15      D-dimer, Quantitative [[id number]]  (Normal) Collected: 02/04/23 1640    Specimen: Blood Updated: 02/04/23 1747     D-Dimer, Quantitative <0.27 MCGFEU/mL     Narrative:      According to the assay 's published package insert, a normal (<0.50 MCGFEU/mL) D-dimer result in conjunction with a non-high clinical probability assessment, excludes deep vein thrombosis (DVT) and pulmonary embolism (PE) with high sensitivity.    D-dimer values increase with age and this can make VTE exclusion of an older population difficult. To address this, the American College [employer], based on best available evidence and recent guidelines, recommends that clinicians use  "age-adjusted D-dimer thresholds in patients greater than 50 years of age with: a) a low probability of PE who do not meet all Pulmonary Embolism Rule Out Criteria, or b) in those with intermediate probability of PE.   The formula for an age-adjusted D-dimer cut-off is \"age/100\".  For example, a 60 year old patient would have an age-adjusted cut-off of 0.60 MCGFEU/mL and an 80 year old 0.80 MCGFEU/mL.    Troponin [[id number]]  (Normal) Collected: 02/04/23 1640    Specimen: Blood Updated: 02/04/23 1715     Troponin T <0.010 ng/mL     Narrative:      Troponin T Reference Range:  <= 0.03 ng/mL-   Negative for AMI  >0.03 ng/mL-     Abnormal for myocardial necrosis.  Clinicians would have to utilize clinical acumen, EKG, Troponin and serial changes to determine if it is an Acute Myocardial Infarction or myocardial injury due to an underlying chronic condition.       Results may be falsely decreased if patient taking Biotin.      CBC Auto Differential [[id number]]  (Normal) Collected: 02/04/23 1640    Specimen: Blood Updated: 02/04/23 1651     WBC 8.72 10*3/mm3      RBC 4.11 10*6/mm3      Hemoglobin 12.7 g/dL      Hematocrit 37.7 %      MCV 91.7 fL      MCH 30.9 pg      MCHC 33.7 g/dL      RDW 12.9 %      RDW-SD 43.2 fl      MPV 10.0 fL      Platelets 285 10*3/mm3      Neutrophil % 67.5 %      Lymphocyte % 22.1 %      Monocyte % 7.8 %      Eosinophil % 1.7 %      Basophil % 0.6 %      Immature Grans % 0.3 %      Neutrophils, Absolute 5.88 10*3/mm3      Lymphocytes, Absolute 1.93 10*3/mm3      Monocytes, Absolute 0.68 10*3/mm3      Eosinophils, Absolute 0.15 10*3/mm3      Basophils, Absolute 0.05 10*3/mm3      Immature Grans, Absolute 0.03 10*3/mm3      nRBC 0.0 /100 WBC            No radiology results from the last 24 hrs       MAE Rey is a 42 y.o. female who presents to the emergency department for evaluation of left-sided chest pain    Differential diagnosis includes ACS, musculoskeletal pain, pleurisy, PE " among other etiologies.    BC, CMP, troponin, D-dimer, chest x-ray, EKG ordered for further evaluation of the patient's presentation.    Chart review including any outside testing was negative for any acute findings, chest x-ray without acute process, EKG nonischemic, D-dimer troponin normal.    Patient was treated with Toradol    Days of symptoms would like 1 troponin sufficient.  Pain is 100% reproducible with movement and deep breaths.  D-dimer negative.  We will have her continue NSAIDs    Plan for disposition is discharged home.  Patient/family comfortable with and understanding of the plan.    HEART Score for Major Cardiac Events - MDCalc  Calculated on Feb 04 2023 5:55 PM  1 points -> Low Score (0-3 points) Risk of MACE of 0.9-1.7%.  Final diagnoses:   Chest pain, unspecified type        Isaias Kim PA-C  02/04/23 1754       Isaias Kim PA-C  02/04/23 1755

## 2023-04-12 ENCOUNTER — HOSPITAL ENCOUNTER (OUTPATIENT)
Dept: MAMMOGRAPHY | Facility: HOSPITAL | Age: 43
Discharge: HOME | End: 2023-04-12
Admitting: NURSE PRACTITIONER
Payer: COMMERCIAL

## 2023-04-12 DIAGNOSIS — Z12.31 SCREENING MAMMOGRAM FOR BREAST CANCER: ICD-10-CM

## 2023-04-12 PROCEDURE — 77067 SCR MAMMO BI INCL CAD: CPT

## 2023-04-12 PROCEDURE — 77063 BREAST TOMOSYNTHESIS BI: CPT

## 2023-04-13 ENCOUNTER — TRANSCRIBE ORDERS (OUTPATIENT)
Dept: ADMINISTRATIVE | Facility: HOSPITAL | Age: 43
End: 2023-04-13
Payer: COMMERCIAL

## 2023-04-13 DIAGNOSIS — R92.8 ABNORMAL MAMMOGRAPHY: Primary | ICD-10-CM

## 2023-05-09 ENCOUNTER — TELEPHONE (OUTPATIENT)
Dept: INTERNAL MEDICINE | Facility: CLINIC | Age: 43
End: 2023-05-09
Payer: COMMERCIAL

## 2023-05-09 NOTE — TELEPHONE ENCOUNTER
Caller: Olamide Rey    Relationship: Self    Best call back number:      [phone]     What is the medical concern/diagnosis:     PATIENT STATED SHE HAS BEEN DIAGNOSED WITH SCOLIOSIS    PATIENT ALSO STATED SHE IS HAVING INCREASED BACK PAIN    What specialty or service is being requested:     BACK SPECIALIST    What is the provider, practice or medical service name:     AS RECOMMENDED BY DR HATCH    PATIENT WOULD LIKE TO DISCUSS FURTHER WITH DR HATCH     PATIENT STATED THE PAIN INCREASES WHILE AT WORK WITH PHYSICAL MOVEMENT SUCH AS BENDING OVER OR PICKING UP ITEMS

## 2023-05-10 NOTE — TELEPHONE ENCOUNTER
Called and spoke with patient and let her know that she will need to be seen in office to discuss this with . Patient has been scheduled for 6/19/23

## 2023-06-19 ENCOUNTER — OFFICE VISIT (OUTPATIENT)
Dept: INTERNAL MEDICINE | Facility: CLINIC | Age: 43
End: 2023-06-19
Payer: COMMERCIAL

## 2023-06-19 VITALS
OXYGEN SATURATION: 97 % | DIASTOLIC BLOOD PRESSURE: 76 MMHG | TEMPERATURE: 96.8 F | SYSTOLIC BLOOD PRESSURE: 128 MMHG | HEART RATE: 60 BPM | BODY MASS INDEX: 30.45 KG/M2 | HEIGHT: 67 IN | WEIGHT: 194 LBS

## 2023-06-19 DIAGNOSIS — M54.16 LUMBAR RADICULOPATHY: Primary | ICD-10-CM

## 2023-06-19 DIAGNOSIS — F41.9 ANXIETY: ICD-10-CM

## 2023-06-19 DIAGNOSIS — G44.209 TENSION HEADACHE: ICD-10-CM

## 2023-06-19 PROCEDURE — 99214 OFFICE O/P EST MOD 30 MIN: CPT | Performed by: INTERNAL MEDICINE

## 2023-06-19 RX ORDER — BUPROPION HYDROCHLORIDE 150 MG/1
150 TABLET, EXTENDED RELEASE ORAL 2 TIMES DAILY
Qty: 30 TABLET | Refills: 1 | Status: SHIPPED | OUTPATIENT
Start: 2023-06-19

## 2023-06-19 RX ORDER — CYCLOBENZAPRINE HCL 10 MG
10 TABLET ORAL NIGHTLY PRN
Qty: 30 TABLET | Refills: 0 | Status: SHIPPED | OUTPATIENT
Start: 2023-06-19

## 2023-06-19 RX ORDER — IBUPROFEN 600 MG/1
600 TABLET ORAL EVERY 6 HOURS PRN
Qty: 30 TABLET | Refills: 0 | Status: SHIPPED | OUTPATIENT
Start: 2023-06-19

## 2023-06-19 NOTE — PROGRESS NOTES
Subjective   Olamide Rey is a 42 y.o. female.     Chief Complaint   Patient presents with    Back Pain     Patient can not lift.     Hip Pain     Hip catches/ locks up.     Headache       Back Pain  This is a new problem. The current episode started more than 1 month ago. The problem occurs daily. The problem has been waxing and waning since onset. The pain is present in the sacro-iliac. The quality of the pain is described as shooting. The pain radiates to the left foot. The pain is at a severity of 7/10. The pain is Worse during the night. The symptoms are aggravated by bending, coughing, position, lying down, sitting, standing, stress and twisting. Stiffness is present At night. Associated symptoms include headaches, leg pain, numbness and tingling. Pertinent negatives include no abdominal pain, bladder incontinence, bowel incontinence, chest pain, dysuria, fever, paresis, paresthesias, pelvic pain, perianal numbness, weakness or weight loss. Risk factors include history of cancer and obesity.   Hip Pain   Associated symptoms include numbness and tingling.   Headache   Left hip pain radiate to left ankle certain position worse and bend over worse. Numb and tingle bearing weight worse. Off and on , also HA off and on , constant , no throb no photophobia no phonophobia.     Current Outpatient Medications:     FLUoxetine (PROzac) 20 MG capsule, Take 2 capsules by mouth Daily., Disp: , Rfl:     ibuprofen (ADVIL,MOTRIN) 600 MG tablet, Take 1 tablet by mouth Every 6 (Six) Hours As Needed for Mild Pain., Disp: 30 tablet, Rfl: 0    buPROPion SR (Wellbutrin SR) 150 MG 12 hr tablet, Take 1 tablet by mouth 2 (Two) Times a Day., Disp: 30 tablet, Rfl: 1    cyclobenzaprine (FLEXERIL) 10 MG tablet, Take 1 tablet by mouth At Night As Needed for Muscle Spasms., Disp: 30 tablet, Rfl: 0    The following portions of the patient's history were reviewed and updated as appropriate: allergies, current medications, past family  history, past medical history, past social history, past surgical history, and problem list.    Review of Systems   Constitutional: Negative.  Negative for fever and weight loss.   Respiratory: Negative.     Cardiovascular: Negative.  Negative for chest pain.   Gastrointestinal: Negative.  Negative for abdominal pain and bowel incontinence.   Genitourinary:  Negative for bladder incontinence, dysuria and pelvic pain.   Musculoskeletal:  Positive for back pain.   Skin: Negative.    Neurological:  Positive for tingling, numbness and headaches. Negative for weakness and paresthesias.   Psychiatric/Behavioral: Negative.       Objective   Physical Exam  Constitutional:       Appearance: She is well-developed.   Cardiovascular:      Rate and Rhythm: Normal rate and regular rhythm.      Heart sounds: Normal heart sounds.   Pulmonary:      Effort: Pulmonary effort is normal.      Breath sounds: Normal breath sounds.   Abdominal:      General: Bowel sounds are normal.      Palpations: Abdomen is soft.   Musculoskeletal:         General: Tenderness present.      Cervical back: Neck supple.   Neurological:      Mental Status: She is alert and oriented to person, place, and time.   Psychiatric:         Behavior: Behavior normal.       All tests have been reviewed.    Assessment & Plan   Diagnoses and all orders for this visit:    Lumbar radiculopathy  -     ibuprofen (ADVIL,MOTRIN) 600 MG tablet; Take 1 tablet by mouth Every 6 (Six) Hours As Needed for Mild Pain.  -     cyclobenzaprine (FLEXERIL) 10 MG tablet; Take 1 tablet by mouth At Night As Needed for Muscle Spasms.  -     Ambulatory Referral to Physical Therapy Evaluate and treat    Tension headache    Anxiety  -     buPROPion SR (Wellbutrin SR) 150 MG 12 hr tablet; Take 1 tablet by mouth 2 (Two) Times a Day.    3 week          Answers submitted by the patient for this visit:  Primary Reason for Visit (Submitted on 6/12/2023)  What is the primary reason for your visit?:  Back Pain

## 2023-09-22 ENCOUNTER — TELEPHONE (OUTPATIENT)
Dept: INTERNAL MEDICINE | Facility: CLINIC | Age: 43
End: 2023-09-22

## 2023-09-22 NOTE — TELEPHONE ENCOUNTER
Caller: Olamide Rey    Relationship: Self    Best call back number: [phone]     What is the medical concern/diagnosis: HIP, BACK AND RADIATING DOWN HER LEG    What specialty or service is being requested:  MRI, CT SCANS OR PHYSICAL THERAPY    What is the provider, practice or medical service name: [facility] DR HATCH RECOMMENDS    What is the office location:     What is the office phone number:     Any additional details:

## 2023-10-12 ENCOUNTER — OFFICE VISIT (OUTPATIENT)
Dept: INTERNAL MEDICINE | Facility: CLINIC | Age: 43
End: 2023-10-12
Payer: COMMERCIAL

## 2023-10-12 VITALS
RESPIRATION RATE: 16 BRPM | OXYGEN SATURATION: 96 % | HEART RATE: 96 BPM | WEIGHT: 194 LBS | DIASTOLIC BLOOD PRESSURE: 74 MMHG | SYSTOLIC BLOOD PRESSURE: 102 MMHG | HEIGHT: 67 IN | BODY MASS INDEX: 30.45 KG/M2

## 2023-10-12 DIAGNOSIS — Z72.0 VAPES NICOTINE CONTAINING SUBSTANCE: ICD-10-CM

## 2023-10-12 DIAGNOSIS — Z23 NEEDS FLU SHOT: Primary | ICD-10-CM

## 2023-10-12 DIAGNOSIS — M54.16 LUMBAR RADICULOPATHY: ICD-10-CM

## 2023-10-12 DIAGNOSIS — F10.11 HISTORY OF ETOH ABUSE: ICD-10-CM

## 2023-10-12 DIAGNOSIS — F15.11 HISTORY OF METHAMPHETAMINE ABUSE: ICD-10-CM

## 2023-10-12 DIAGNOSIS — F41.9 ANXIETY: ICD-10-CM

## 2023-10-12 PROBLEM — Z00.00 ANNUAL PHYSICAL EXAM: Status: RESOLVED | Noted: 2022-09-07 | Resolved: 2023-10-12

## 2023-10-12 PROBLEM — J06.9 ACUTE URI: Status: RESOLVED | Noted: 2022-09-07 | Resolved: 2023-10-12

## 2023-10-12 RX ORDER — MELOXICAM 5 MG/1
5 CAPSULE ORAL DAILY
Qty: 90 CAPSULE | Refills: 0 | Status: SHIPPED | OUTPATIENT
Start: 2023-10-12 | End: 2023-10-16 | Stop reason: SDUPTHER

## 2023-10-12 NOTE — PROGRESS NOTES
"    Office Note     Name: Olamide Rey    : 1980     MRN: [id number]     Chief Complaint  Establish Care (Offboarding Dr. Hay) and Back Pain (Believes it to be sciatica. Pain is progressively getting worse.)    Subjective     History of Present Illness:  Olamide Rey is a 42 y.o. female who presents today for back pain. Started 6 months ago, noted low back pain worsening the past few months worse when she starts to overwork herself, turning on left side and standing for too long. Relieved by sitting down and resting then comes back when she tries to stand back up.   Works at [employer] and often mops and cleans the floor.  Has tried Ibuprofen bid, everyday but without relief  Hx of IV drug user, lives in [facility] (does not allow niquil, flexeril and wellbutrin)      Family History:   Family History   Problem Relation Age of Onset    Ovarian cancer Mother     Alcohol abuse Mother     Cancer Mother     Drug abuse Mother     Hyperlipidemia Mother     Mental illness Mother     High cholesterol Paternal Grandfather     Hyperlipidemia Paternal Grandfather     Alcohol abuse Father     Drug abuse Father     Early death Father     Liver disease Father     Mental illness Father     COPD Paternal Grandmother     Hyperlipidemia Paternal Aunt     Breast cancer Neg Hx        Social History:   Social History     Socioeconomic History    Marital status: Single   Tobacco Use    Smoking status: Former     Packs/day: 1.00     Years: 22.00     Additional pack years: 0.00     Total pack years: 22.00     Types: Cigarettes     Quit date: 2023     Years since quittin.1    Smokeless tobacco: Never   Vaping Use    Vaping Use: Every day    Substances: Nicotine    Devices: Refillable tank   Substance and Sexual Activity    Alcohol use: No    Drug use: Not Currently     Types: \"Crack\" cocaine, Cocaine(coke), Fentanyl, Hydrocodone, MDMA (ecstacy), Methamphetamines     Comment: I have been sober since 23    Sexual " "activity: Not Currently     Partners: Male     Birth control/protection: Abstinence       Health Maintenance   Topic Date Due    ANNUAL PHYSICAL  09/07/2023    LIPID PANEL  09/30/2023    COVID-19 Vaccine (1) 06/19/2024 (Originally 5/22/1981)    TDAP/TD VACCINES (2 - Td or Tdap) 06/19/2024 (Originally 12/1/2022)    Hepatitis B (2 of 3 - Hep B Twinrix 3-dose series) 09/17/2025 (Originally 12/26/2017)    BMI FOLLOWUP  06/19/2024    PAP SMEAR  03/08/2025    HEPATITIS C SCREENING  Completed    INFLUENZA VACCINE  Completed    Pneumococcal Vaccine 0-64  Aged Out       Objective     Vital Signs  /74   Pulse 96   Resp 16   Ht 170.2 cm (67.01\")   Wt 88 kg (194 lb)   SpO2 96%   BMI 30.38 kg/mý   Estimated body mass index is 30.38 kg/mý as calculated from the following:    Height as of this encounter: 170.2 cm (67.01\").    Weight as of this encounter: 88 kg (194 lb).          Physical Exam  Vitals and nursing note reviewed.   Constitutional:       Appearance: Normal appearance.   HENT:      Head: Normocephalic and atraumatic.   Cardiovascular:      Rate and Rhythm: Normal rate and regular rhythm.      Pulses: Normal pulses.      Heart sounds: Normal heart sounds.   Pulmonary:      Effort: Pulmonary effort is normal. No respiratory distress.      Breath sounds: Normal breath sounds. No wheezing, rhonchi or rales.   Abdominal:      General: Abdomen is flat. Bowel sounds are normal.      Palpations: Abdomen is soft.      Tenderness: There is no abdominal tenderness. There is no guarding.   Musculoskeletal:      Cervical back: Neck supple.      Comments: Minimal tenderness on left upper buttock, no spinal step off deformities, no midline tenderness   Skin:     General: Skin is warm.      Capillary Refill: Capillary refill takes less than 2 seconds.   Neurological:      General: No focal deficit present.      Mental Status: She is alert. Mental status is at baseline.   Psychiatric:         Mood and Affect: Mood normal.   "       Behavior: Behavior normal.          Procedures     Assessment and Plan     Diagnoses and all orders for this visit:    1. Needs flu shot (Primary)  -     Fluzone (or Fluarix & Flulaval for VFC) >6 Mos (3045-6000)    2. Lumbar radiculopathy  -     Meloxicam 5 MG capsule; Take 5 mg by mouth Daily.  Dispense: 90 capsule; Refill: 0    3. Anxiety  -     CBC (No Diff); Future  -     Comprehensive Metabolic Panel; Future  -     Lipid Panel; Future  -     TSH Rfx On Abnormal To Free T4; Future    4. History of ETOH abuse  -     CBC (No Diff); Future  -     Comprehensive Metabolic Panel; Future  -     Lipid Panel; Future  -     TSH Rfx On Abnormal To Free T4; Future    5. History of methamphetamine abuse    6. Vapes nicotine containing substance  -     CBC (No Diff); Future  -     Comprehensive Metabolic Panel; Future  -     Lipid Panel; Future  -     TSH Rfx On Abnormal To Free T4; Future         Counseling was given to patient for the following topics: instructions for management, risks and benefits of treatment options, and importance of treatment compliance.    Follow Up  Return in about 6 weeks (around 11/23/2023).    MD DONNELL Mendosa [facility]  [address] [place] [id number] [phone]

## 2023-10-16 RX ORDER — IBUPROFEN 600 MG/1
600 TABLET ORAL 2 TIMES DAILY
Qty: 30 TABLET | Refills: 0 | Status: SHIPPED | OUTPATIENT
Start: 2023-10-16

## 2023-11-06 ENCOUNTER — OFFICE VISIT (OUTPATIENT)
Dept: INTERNAL MEDICINE | Facility: CLINIC | Age: 43
End: 2023-11-06
Payer: COMMERCIAL

## 2023-11-06 VITALS
BODY MASS INDEX: 30.13 KG/M2 | SYSTOLIC BLOOD PRESSURE: 108 MMHG | RESPIRATION RATE: 16 BRPM | WEIGHT: 192 LBS | HEIGHT: 67 IN | DIASTOLIC BLOOD PRESSURE: 72 MMHG | HEART RATE: 85 BPM | OXYGEN SATURATION: 97 %

## 2023-11-06 DIAGNOSIS — Z30.013 ENCOUNTER FOR INITIAL PRESCRIPTION OF INJECTABLE CONTRACEPTIVE: Primary | ICD-10-CM

## 2023-11-06 DIAGNOSIS — F17.210 CIGARETTE NICOTINE DEPENDENCE WITHOUT COMPLICATION: ICD-10-CM

## 2023-11-06 PROCEDURE — 99213 OFFICE O/P EST LOW 20 MIN: CPT | Performed by: STUDENT IN AN ORGANIZED HEALTH CARE EDUCATION/TRAINING PROGRAM

## 2023-11-06 NOTE — ASSESSMENT & PLAN NOTE
Daily tobacco user, prolonged discussion on smoking cessation.  Advised patient that she cannot take or use any estrogen containing contraception.  She agreed and showed complete understanding.

## 2023-11-06 NOTE — PROGRESS NOTES
"    Office Note     Name: Olamide Rey    : 1980     MRN: [id number]     Chief Complaint  Contraception (Discuss birth control)    Subjective     History of Present Illness:  Olamide Rey is a 42 y.o. female who presents today for contraception management.   Interested in contraceptive management but has a history of ectopic pregnancy, PID and currently smokes cigarettes and vapes daily.  She would prefer DMPA injections.     Family History:   Family History   Problem Relation Age of Onset    Ovarian cancer Mother     Alcohol abuse Mother     Cancer Mother     Drug abuse Mother     Hyperlipidemia Mother     Mental illness Mother     High cholesterol Paternal Grandfather     Hyperlipidemia Paternal Grandfather     Alcohol abuse Father     Drug abuse Father     Early death Father     Liver disease Father     Mental illness Father     COPD Paternal Grandmother     Hyperlipidemia Paternal Aunt     Breast cancer Neg Hx        Social History:   Social History     Socioeconomic History    Marital status: Single   Tobacco Use    Smoking status: Every Day     Packs/day: 0.50     Years: 22.00     Additional pack years: 0.00     Total pack years: 11.00     Types: Cigarettes     Last attempt to quit: 2023     Years since quittin.2    Smokeless tobacco: Never   Vaping Use    Vaping Use: Every day    Substances: Nicotine    Devices: Refillable tank   Substance and Sexual Activity    Alcohol use: No    Drug use: Not Currently     Types: \"Crack\" cocaine, Cocaine(coke), Fentanyl, Hydrocodone, MDMA (ecstacy), Methamphetamines     Comment: I have been sober since 23    Sexual activity: Not Currently     Partners: Male     Birth control/protection: Abstinence       Health Maintenance   Topic Date Due    ANNUAL PHYSICAL  2023    LIPID PANEL  2023    COVID-19 Vaccine (1) 2024 (Originally 1983)    TDAP/TD VACCINES (2 - Td or Tdap) 2024 (Originally 2022)    Pneumococcal " "Vaccine 0-64 (2 - PCV) 09/15/2025 (Originally 5/24/2021)    Hepatitis B (2 of 3 - Hep B Twinrix 3-dose series) 09/17/2025 (Originally 12/26/2017)    BMI FOLLOWUP  06/19/2024    PAP SMEAR  03/08/2025    HEPATITIS C SCREENING  Completed    INFLUENZA VACCINE  Completed       Objective     Vital Signs  /72   Pulse 85   Resp 16   Ht 170.2 cm (67.01\")   Wt 87.1 kg (192 lb)   SpO2 97%   BMI 30.06 kg/m²   Estimated body mass index is 30.06 kg/m² as calculated from the following:    Height as of this encounter: 170.2 cm (67.01\").    Weight as of this encounter: 87.1 kg (192 lb).        Physical Exam  Vitals and nursing note reviewed.   Constitutional:       Appearance: Normal appearance.   HENT:      Head: Normocephalic and atraumatic.   Cardiovascular:      Rate and Rhythm: Normal rate and regular rhythm.      Pulses: Normal pulses.      Heart sounds: Normal heart sounds.   Pulmonary:      Effort: Pulmonary effort is normal. No respiratory distress.      Breath sounds: Normal breath sounds. No wheezing, rhonchi or rales.   Abdominal:      General: Abdomen is flat. Bowel sounds are normal.      Palpations: Abdomen is soft.      Tenderness: There is no abdominal tenderness. There is no guarding.   Musculoskeletal:      Cervical back: Neck supple.   Skin:     General: Skin is warm.      Capillary Refill: Capillary refill takes less than 2 seconds.   Neurological:      General: No focal deficit present.      Mental Status: She is alert. Mental status is at baseline.   Psychiatric:         Mood and Affect: Mood normal.         Behavior: Behavior normal.          Procedures     Assessment and Plan     Diagnoses and all orders for this visit:    1. Encounter for initial prescription of injectable contraceptive (Primary)  Assessment & Plan:  Education on risks and benefits given regarding options for contraception, including barrier methods, injectable contraception, IUD placement, oral contraceptives.  Patient would " like to proceed with Depo-Provera injections every 3 months.  Will obtain labs including pregnancy test.  Once all normal and pregnancy test negative may start medroxyprogesterone acetate  mg every 3 months and recommend 1300 mg calcium and 600 IU vitamin D daily as well as weightbearing exercises.    Orders:  -     hCG, Serum, Qualitative; Future    2. Cigarette nicotine dependence without complication  Assessment & Plan:  Daily tobacco user, prolonged discussion on smoking cessation.  Advised patient that she cannot take or use any estrogen containing contraception.  She agreed and showed complete understanding.           Counseling was given to patient for the following topics: instructions for management.    Follow Up  Return in about 4 weeks (around 12/4/2023).    MD DONNELL Mendosa [facility]  [address] [place] [id number] [phone]

## 2023-11-06 NOTE — ASSESSMENT & PLAN NOTE
Education on risks and benefits given regarding options for contraception, including barrier methods, injectable contraception, IUD placement, oral contraceptives.  Patient would like to proceed with Depo-Provera injections every 3 months.  Will obtain labs including pregnancy test.  Once all normal and pregnancy test negative may start medroxyprogesterone acetate  mg every 3 months and recommend 1300 mg calcium and 600 IU vitamin D daily as well as weightbearing exercises.

## 2024-01-30 ENCOUNTER — TRANSCRIBE ORDERS (OUTPATIENT)
Dept: ADMINISTRATIVE | Facility: HOSPITAL | Age: 44
End: 2024-01-30
Payer: COMMERCIAL

## 2024-01-30 DIAGNOSIS — R92.8 ABNORMAL MAMMOGRAM: Primary | ICD-10-CM

## 2024-04-05 ENCOUNTER — HOSPITAL ENCOUNTER (OUTPATIENT)
Dept: MAMMOGRAPHY | Facility: HOSPITAL | Age: 44
Discharge: HOME | End: 2024-04-05
Admitting: STUDENT IN AN ORGANIZED HEALTH CARE EDUCATION/TRAINING PROGRAM
Payer: COMMERCIAL

## 2024-04-05 DIAGNOSIS — R92.8 ABNORMAL MAMMOGRAM: ICD-10-CM

## 2024-04-05 PROCEDURE — 77065 DX MAMMO INCL CAD UNI: CPT

## 2024-04-05 PROCEDURE — G0279 TOMOSYNTHESIS, MAMMO: HCPCS

## 2024-05-22 ENCOUNTER — TELEPHONE (OUTPATIENT)
Dept: INTERNAL MEDICINE | Facility: CLINIC | Age: 44
End: 2024-05-22

## 2024-05-22 ENCOUNTER — TELEMEDICINE (OUTPATIENT)
Dept: INTERNAL MEDICINE | Facility: CLINIC | Age: 44
End: 2024-05-22
Payer: COMMERCIAL

## 2024-05-22 VITALS — HEIGHT: 67 IN | WEIGHT: 196 LBS | BODY MASS INDEX: 30.76 KG/M2

## 2024-05-22 DIAGNOSIS — B86 SCABIES: Primary | ICD-10-CM

## 2024-05-22 PROCEDURE — 99214 OFFICE O/P EST MOD 30 MIN: CPT | Performed by: STUDENT IN AN ORGANIZED HEALTH CARE EDUCATION/TRAINING PROGRAM

## 2024-05-22 RX ORDER — AMMONIUM LACTATE 12 G/100G
LOTION TOPICAL AS NEEDED
Qty: 1 EACH | Refills: 2 | Status: SHIPPED | OUTPATIENT
Start: 2024-05-22

## 2024-05-22 RX ORDER — DIPHENHYDRAMINE HCL 25 MG
25 TABLET ORAL NIGHTLY PRN
Qty: 30 TABLET | Refills: 0 | Status: SHIPPED | OUTPATIENT
Start: 2024-05-22

## 2024-05-22 RX ORDER — PERMETHRIN 50 MG/G
1 CREAM TOPICAL ONCE
Qty: 1 EACH | Refills: 0 | Status: SHIPPED | OUTPATIENT
Start: 2024-05-22 | End: 2024-05-22

## 2024-05-22 RX ORDER — CETIRIZINE HYDROCHLORIDE 10 MG/1
10 TABLET ORAL DAILY
Qty: 60 TABLET | Refills: 0 | Status: SHIPPED | OUTPATIENT
Start: 2024-05-22

## 2024-05-22 RX ORDER — PERMETHRIN 50 MG/G
1 CREAM TOPICAL ONCE
Qty: 1 G | Refills: 0 | Status: SHIPPED | OUTPATIENT
Start: 2024-05-22 | End: 2024-05-22

## 2024-05-22 NOTE — TELEPHONE ENCOUNTER
Caller: Olamide Rey    Relationship: Self    Best call back number: [phone]     What is the best time to reach you: ANY    Who are you requesting to speak with (clinical staff, provider,  specific staff member): NURSE    Do you know the name of the person who called: PATIENT    What was the call regarding: PATIENT FEELS SHE HAS SCABIES AND WOULD LIKE MEDICATION FOR THIS.     PHARMACY:  [facility] [address]    Is it okay if the provider responds through MyChart: PHONE CALL PLEASE

## 2024-05-22 NOTE — PROGRESS NOTES
"Chief Complaint  Rash (Believes it's scabies)    This was an audio and video enabled telemedicine encounter.    Subjective      Olamide Rey presents to [facility]  Rash      Starting 2 days ago, noted generalized itching, worse in armpits, waistline, hands, in between fingers, scalp and in between toes. Noted linear bugs on her chin. No fever, pain or swelling of joints.     Objective   Vital Signs:   Ht 170.2 cm (67.01\")   Wt 88.9 kg (196 lb)   BMI 30.69 kg/m²     Body mass index is 30.69 kg/m².    Review of Systems   Skin:  Positive for rash.          Current Outpatient Medications:     permethrin (ELIMITE) 5 % cream, Apply 1 Application topically to the appropriate area as directed 1 (One) Time for 1 dose., Disp: 1 each, Rfl: 0    ammonium lactate (LAC-HYDRIN) 12 % lotion, Apply  topically to the appropriate area as directed As Needed for Dry Skin., Disp: 1 each, Rfl: 2    cetirizine (zyrTEC) 10 MG tablet, Take 1 tablet by mouth Daily., Disp: 60 tablet, Rfl: 0    diphenhydrAMINE (Benadryl Allergy) 25 MG tablet, Take 1 tablet by mouth At Night As Needed for Itching or Allergies., Disp: 30 tablet, Rfl: 0      Allergies: Amoxicillin and Sulfa antibiotics    Physical Exam  HENT:      Head: Normocephalic.   Skin:     Comments: Red papules and few crusts, pruritic, on bilateral MCPs, chin and neck   Neurological:      Mental Status: She is alert.          Result Review :                  Assessment and Plan   Diagnoses and all orders for this visit:    1. Scabies (Primary)  Assessment & Plan:     Permethrin cream 1-2 weeks apart 30g single application x 2 doses if living mites are observed after 14 days. Apply to neck to soles of feet including nails, may apply on hairline/scalp for adults leave on for 8-14 hours before removing by washing/shower/bath  Ammonium lactate - added if with crusting  Cetirizine/benadryl at night - itching may persist up to 4 weeks after " treatment    Avoid direct skin contact  Contacts should be treated  Clothes and linens machine washed and dried using heat cycle  Routine cleaning and vacuuming rooms where patient is  Return to work/school after first treatment    Orders:  -     Discontinue: permethrin (ELIMITE) 5 % cream; Apply 1 Application topically to the appropriate area as directed 1 (One) Time for 1 dose.  Dispense: 1 g; Refill: 0  -     ammonium lactate (LAC-HYDRIN) 12 % lotion; Apply  topically to the appropriate area as directed As Needed for Dry Skin.  Dispense: 1 each; Refill: 2  -     cetirizine (zyrTEC) 10 MG tablet; Take 1 tablet by mouth Daily.  Dispense: 60 tablet; Refill: 0  -     diphenhydrAMINE (Benadryl Allergy) 25 MG tablet; Take 1 tablet by mouth At Night As Needed for Itching or Allergies.  Dispense: 30 tablet; Refill: 0  -     permethrin (ELIMITE) 5 % cream; Apply 1 Application topically to the appropriate area as directed 1 (One) Time for 1 dose.  Dispense: 1 each; Refill: 0                   Follow Up   No follow-ups on file.  Patient was given instructions and counseling regarding her condition or for health maintenance advice. Please see specific information pulled into the AVS if appropriate.     Miriam Degroot MD

## 2024-05-22 NOTE — ASSESSMENT & PLAN NOTE
Permethrin cream 1-2 weeks apart 30g single application x 2 doses if living mites are observed after 14 days. Apply to neck to soles of feet including nails, may apply on hairline/scalp for adults leave on for 8-14 hours before removing by washing/shower/bath  Ammonium lactate - added if with crusting  Cetirizine/benadryl at night - itching may persist up to 4 weeks after treatment    Avoid direct skin contact  Contacts should be treated  Clothes and linens machine washed and dried using heat cycle  Routine cleaning and vacuuming rooms where patient is  Return to work/school after first treatment

## 2024-06-16 ENCOUNTER — HOSPITAL ENCOUNTER (EMERGENCY)
Facility: HOSPITAL | Age: 44
Discharge: HOME | End: 2024-06-16
Attending: EMERGENCY MEDICINE | Admitting: EMERGENCY MEDICINE
Payer: COMMERCIAL

## 2024-06-16 VITALS
HEART RATE: 80 BPM | SYSTOLIC BLOOD PRESSURE: 118 MMHG | RESPIRATION RATE: 18 BRPM | BODY MASS INDEX: 30.76 KG/M2 | DIASTOLIC BLOOD PRESSURE: 78 MMHG | HEIGHT: 67 IN | WEIGHT: 196 LBS | OXYGEN SATURATION: 96 % | TEMPERATURE: 98.8 F

## 2024-06-16 DIAGNOSIS — G43.909 MIGRAINE WITHOUT STATUS MIGRAINOSUS, NOT INTRACTABLE, UNSPECIFIED MIGRAINE TYPE: Primary | ICD-10-CM

## 2024-06-16 PROCEDURE — 25810000003 SODIUM CHLORIDE 0.9 % SOLUTION: Performed by: NURSE PRACTITIONER

## 2024-06-16 PROCEDURE — 96372 THER/PROPH/DIAG INJ SC/IM: CPT

## 2024-06-16 PROCEDURE — 25010000002 METOCLOPRAMIDE PER 10 MG: Performed by: NURSE PRACTITIONER

## 2024-06-16 PROCEDURE — 96375 TX/PRO/DX INJ NEW DRUG ADDON: CPT

## 2024-06-16 PROCEDURE — 25010000002 KETOROLAC TROMETHAMINE PER 15 MG: Performed by: NURSE PRACTITIONER

## 2024-06-16 PROCEDURE — 99283 EMERGENCY DEPT VISIT LOW MDM: CPT

## 2024-06-16 PROCEDURE — 96374 THER/PROPH/DIAG INJ IV PUSH: CPT

## 2024-06-16 PROCEDURE — 25010000002 DEXAMETHASONE SODIUM PHOSPHATE 10 MG/ML SOLUTION: Performed by: NURSE PRACTITIONER

## 2024-06-16 RX ORDER — METOCLOPRAMIDE HYDROCHLORIDE 5 MG/ML
10 INJECTION INTRAMUSCULAR; INTRAVENOUS ONCE
Status: COMPLETED | OUTPATIENT
Start: 2024-06-16 | End: 2024-06-16

## 2024-06-16 RX ORDER — SUMATRIPTAN 6 MG/.5ML
6 INJECTION, SOLUTION SUBCUTANEOUS ONCE
Status: COMPLETED | OUTPATIENT
Start: 2024-06-16 | End: 2024-06-16

## 2024-06-16 RX ORDER — KETOROLAC TROMETHAMINE 30 MG/ML
30 INJECTION, SOLUTION INTRAMUSCULAR; INTRAVENOUS ONCE
Status: COMPLETED | OUTPATIENT
Start: 2024-06-16 | End: 2024-06-16

## 2024-06-16 RX ORDER — SODIUM CHLORIDE 9 MG/ML
125 INJECTION, SOLUTION INTRAVENOUS CONTINUOUS
Status: DISCONTINUED | OUTPATIENT
Start: 2024-06-16 | End: 2024-06-16 | Stop reason: HOSPADM

## 2024-06-16 RX ORDER — DEXAMETHASONE SODIUM PHOSPHATE 10 MG/ML
10 INJECTION, SOLUTION INTRAMUSCULAR; INTRAVENOUS ONCE
Status: COMPLETED | OUTPATIENT
Start: 2024-06-16 | End: 2024-06-16

## 2024-06-16 RX ORDER — SODIUM CHLORIDE 0.9 % (FLUSH) 0.9 %
10 SYRINGE (ML) INJECTION AS NEEDED
Status: DISCONTINUED | OUTPATIENT
Start: 2024-06-16 | End: 2024-06-16 | Stop reason: HOSPADM

## 2024-06-16 RX ADMIN — KETOROLAC TROMETHAMINE 30 MG: 30 INJECTION, SOLUTION INTRAMUSCULAR; INTRAVENOUS at 16:43

## 2024-06-16 RX ADMIN — DEXAMETHASONE SODIUM PHOSPHATE 10 MG: 10 INJECTION INTRAMUSCULAR; INTRAVENOUS at 16:43

## 2024-06-16 RX ADMIN — METOCLOPRAMIDE 10 MG: 5 INJECTION, SOLUTION INTRAMUSCULAR; INTRAVENOUS at 16:43

## 2024-06-16 RX ADMIN — SUMATRIPTAN 6 MG: 6 INJECTION SUBCUTANEOUS at 16:43

## 2024-06-16 RX ADMIN — SODIUM CHLORIDE 1000 ML: 900 INJECTION, SOLUTION INTRAVENOUS at 16:41

## 2024-06-16 NOTE — ED PROVIDER NOTES
"Pt Name: Olamide Rey  MRN: [id number]  : 1980  Date of Encounter: 2024    PCP: Miriam Degroot MD      Subjective    History of Present Illness:    Chief Complaint: Headache    History of Present Illness: Olamide Rey is a 43 y.o. female who presents to the ER complaining of headache that has been ongoing for the last 48 hours and has progressively worsened over the interval.  Patient states she has had some nausea, no vomiting light sensitivity, sound sensitivity.  Patient states she has a history of migraine headaches.        Nurses Notes reviewed and agree, including vitals, allergies, social history and prior medical history.       Allergies:    Amoxicillin and Sulfa antibiotics    Past Medical History:   Diagnosis Date    Abnormal Pap smear of cervix     ADHD (attention deficit hyperactivity disorder)     I would like to be assessed for ADHD    Alcoholism     Anemia     Anxiety     Chlamydia     Ectopic pregnancy     Female infertility     Gonorrhea     Hepatitis C     HPV (human papilloma virus) infection     PID (pelvic inflammatory disease)     Substance abuse     Urinary tract infection     Urogenital trichomoniasis     Varicella        Past Surgical History:   Procedure Laterality Date    DENTAL PROCEDURE      ECTOPIC PREGNANCY Right     SALPINGECTOMY Right        Social History     Socioeconomic History    Marital status: Single   Tobacco Use    Smoking status: Former     Current packs/day: 0.00     Average packs/day: 0.5 packs/day for 22.0 years (11.0 ttl pk-yrs)     Types: Cigarettes     Start date: 2001     Quit date: 2023     Years since quittin.8    Smokeless tobacco: Never   Vaping Use    Vaping status: Every Day    Substances: Nicotine    Devices: Refillable tank   Substance and Sexual Activity    Alcohol use: No    Drug use: Not Currently     Types: \"Crack\" cocaine, Cocaine(coke), Fentanyl, Hydrocodone, MDMA (ecstacy), Methamphetamines     Comment: I have " been sober since 5/12/21    Sexual activity: Not Currently     Partners: Male     Birth control/protection: Abstinence       Family History   Problem Relation Age of Onset    Ovarian cancer Mother     Alcohol abuse Mother     Cancer Mother     Drug abuse Mother     Hyperlipidemia Mother     Mental illness Mother     High cholesterol Paternal Grandfather     Hyperlipidemia Paternal Grandfather     Alcohol abuse Father     Drug abuse Father     Early death Father     Liver disease Father     Mental illness Father     COPD Paternal Grandmother     Hyperlipidemia Paternal Aunt     Breast cancer Neg Hx        REVIEW OF SYSTEMS:     All systems reviewed and not pertinent unless noted.    Review of Systems   Neurological:  Positive for headaches.       Objective    Physical Exam  Vitals and nursing note reviewed.   Constitutional:       Appearance: Normal appearance.   HENT:      Head: Normocephalic and atraumatic.   Eyes:      Extraocular Movements: Extraocular movements intact.      Pupils: Pupils are equal, round, and reactive to light.   Cardiovascular:      Rate and Rhythm: Normal rate and regular rhythm.      Pulses: Normal pulses.      Heart sounds: Normal heart sounds.   Pulmonary:      Effort: Pulmonary effort is normal.      Breath sounds: Normal breath sounds.   Abdominal:      General: Abdomen is flat. Bowel sounds are normal.      Palpations: Abdomen is soft.   Musculoskeletal:      Cervical back: Normal range of motion and neck supple.   Skin:     Capillary Refill: Capillary refill takes less than 2 seconds.   Neurological:      General: No focal deficit present.      Mental Status: She is alert and oriented to person, place, and time. Mental status is at baseline.      GCS: GCS eye subscore is 4. GCS verbal subscore is 5. GCS motor subscore is 6.      Sensory: Sensation is intact.      Motor: Motor function is intact.      Gait: Gait is intact.   Psychiatric:         Attention and Perception: Attention and  perception normal.         Mood and Affect: Mood and affect normal.         Speech: Speech normal.         Behavior: Behavior normal. Behavior is cooperative.               Procedures    ED Course:         LAB Results:    Lab Results (last 24 hours)       ** No results found for the last 24 hours. **             If labs were ordered, I have independently reviewed the results and considered them in the diagnosis and treatment plan for the patient    RADIOLOGY    No radiology results from the last 24 hrs     If I have ordered, I have independently reviewed the above noted radiographic studies.  Please see the radiologist dictation for the official interpretation    Medications given to patient in the ER    Medications   sodium chloride 0.9 % flush 10 mL (has no administration in time range)   sodium chloride 0.9 % infusion (has no administration in time range)   sodium chloride 0.9 % bolus 1,000 mL (1,000 mL Intravenous New Bag 6/16/24 1641)   metoclopramide (REGLAN) injection 10 mg (10 mg Intravenous Given 6/16/24 1643)   ketorolac (TORADOL) injection 30 mg (30 mg Intravenous Given 6/16/24 1643)   SUMAtriptan (IMITREX) injection 6 mg (6 mg Subcutaneous Given 6/16/24 1643)   dexAMETHasone sodium phosphate injection 10 mg (10 mg Intravenous Given 6/16/24 1643)                 Shared Decision Making: After my consideration the clinical presentation and laboratory/radiology studies obtained, I discussed the findings with the patient/patient representative who is in agreement with the treatment plan and final disposition. Risks and benefits of discharge and/or observation admission were discussed.  Final disposition of the patient will be discharged home and request patient follow-up with primary care provider in the next 7 days for reevaluation.    Medications prescribed: No new medications were prescribed during this ER visit       Medical Decision Making   Olamide Rey is a 43 y.o. female who presents to the ER  complaining of headache that has been ongoing for the last 48 hours and has progressively worsened over the interval.  Patient states she has had some nausea, no vomiting light sensitivity, sound sensitivity.  Patient states she has a history of migraine headaches.    DDX: includes but is not limited to: Headache, migraine, other    Amount and/or Complexity of Data Reviewed  Discussion of management or test interpretation with external provider(s): Discussed assessment, treatment and plan with ER attending    Risk  Prescription drug management.  Risk Details: I have discussed with patient the finding of the test preformed today. Patient has been diagnosed with migraine and will be discharged home.  Patient requested to follow-up with primary care provider within the next 7 days for reevaluation. Strict return precautions have been given and patient verbalizes understanding          Final diagnoses:   Migraine without status migrainosus, not intractable, unspecified migraine type         Please note that portions of this document were completed using voice recognition dictation software.       Raul Perrin, DAVID  06/16/24 [id number]

## 2025-07-29 ENCOUNTER — TELEPHONE (OUTPATIENT)
Dept: INTERNAL MEDICINE | Facility: CLINIC | Age: 45
End: 2025-07-29

## 2025-07-29 DIAGNOSIS — F41.9 ANXIETY: Primary | ICD-10-CM

## 2025-07-29 DIAGNOSIS — M54.16 LUMBAR RADICULOPATHY: ICD-10-CM

## 2025-07-29 NOTE — TELEPHONE ENCOUNTER
Caller: Olamide Rey.    Relationship to patient: Self    Best call back number: [phone]     PATIENT IS HAVING ITCHING IN HER VAGINAL AREA AND WANTED TO KNOW IF SHE CAN GET FLAGYL AND SHE USUALLY GETS YEAST INFECTIONS, SO CAN SHE ALSO GET MEDICATION FOR THAT.  HER PHARMACY IS [facility].  SHE CURRENTLY DOES NOT HAVE INSURANCE.

## 2025-07-29 NOTE — TELEPHONE ENCOUNTER
Spoke with patient. Message relayed.  Let patient we do have a mobile clinc on Wednesday and Thursdays. Gave patient the different locations. Patient will try to be seen at one of the locations since she does not have insurance right now.

## 2025-07-30 ENCOUNTER — REFERRAL TRIAGE (OUTPATIENT)
Age: 45
End: 2025-07-30

## 2025-08-01 ENCOUNTER — PATIENT OUTREACH (OUTPATIENT)
Age: 45
End: 2025-08-01

## 2025-08-01 NOTE — OUTREACH NOTE
Social Work Assessment  Questions/Answers      Flowsheet Row Most Recent Value   Referral Source physician   Reason for Consult community resources   Preferred Language English   Current Living Arrangements home   Potentially Unsafe Housing Conditions none   Primary Care Provided by self   Quality of Family Relationships unable to assess   Source of Income salary/wages   Application for Public Assistance applied   Medications independent   Meal Preparation independent   Housekeeping independent   Laundry independent   Shopping independent   If for any reason you need help with day-to-day activities such as bathing, preparing meals, shopping, managing finances, etc., do you get the help you need? I don't need any help   Q1: How often do you have a drink containing alcohol? Pt Declined        SDOH updated and reviewed with the patient during this program:  --     Alcohol Use: Unknown (8/1/2025)    AUDIT-C     Frequency of Alcohol Consumption: Patient declined      --     Depression: Not at risk (6/19/2023)    PHQ-2     PHQ-2 Score: 0      --      Disabilities      --      Employment      Financial Resource Strain: Medium Risk (8/1/2025)    Overall Financial Resource Strain (CARDIA)     Difficulty of Paying Living Expenses: Somewhat hard      --     Food Insecurity: No Food Insecurity (8/1/2025)    Hunger Vital Sign     Worried About Running Out of Food in the Last Year: Never true     Ran Out of Food in the Last Year: Never true      --     Health Literacy: Unknown (8/1/2025)    Education     Preferred Language: English      --     Housing Stability: Not At Risk (8/1/2025)    Housing Stability     Current Living Arrangements: home     Potentially Unsafe Housing Conditions: none      --     Interpersonal Safety: Unknown (8/1/2025)    Humiliation, Afraid, Rape, and Kick questionnaire     Fear of Current or Ex-Partner: Patient declined      --     Physical Activity: Patient Declined (8/1/2025)    Exercise Vital Sign      Days of Exercise per Week: Patient declined     Minutes of Exercise per Session: Patient declined      --     Social Connections: Unknown (8/1/2025)    Family and Community Support     Help with Day-to-Day Activities: I don't need any help        --     Tobacco Use: Medium Risk (6/16/2024)    Patient History     Smoking Tobacco Use: Former     Smokeless Tobacco Use: Never      --     Transportation Needs: No Transportation Needs (8/1/2025)    PRAPARE - Transportation     Lack of Transportation (Medical): No     Lack of Transportation (Non-Medical): No        Continuing Care   [facility]    [address] [place] [address]    Phone: [phone]    Request Status: Pending - No Request Sent    Services: Financial Assistance    Resource for: Financial Resource Strain, Utilities   Patient Outreach    SW spoke with pt re her referral for non-insurance and needing to be seen. SW discussed private pay options, applying through the marketplace, and the mobile clinic with pt. Per pt, she tried to go to the mobile clinic but no one was there. SW sent the phone number to see about where it's going to be with dates and times, to pt My Chart. Pt has applied for insurance through the marketplace and is waiting to hear back on if she has an exception or not. Pt makes too much for Medicaid. SUSIE will continue to monitor for the next thirty days.     Elfego HOLLOWAY -   Ambulatory Case Management    8/1/2025, 11:32 EDT